# Patient Record
Sex: FEMALE | Race: OTHER | HISPANIC OR LATINO | URBAN - METROPOLITAN AREA
[De-identification: names, ages, dates, MRNs, and addresses within clinical notes are randomized per-mention and may not be internally consistent; named-entity substitution may affect disease eponyms.]

---

## 2020-03-01 ENCOUNTER — OUTPATIENT (OUTPATIENT)
Dept: OUTPATIENT SERVICES | Facility: HOSPITAL | Age: 50
LOS: 1 days | End: 2020-03-01
Payer: MEDICAID

## 2020-03-01 PROCEDURE — G9001: CPT

## 2020-03-10 ENCOUNTER — INPATIENT (INPATIENT)
Facility: HOSPITAL | Age: 50
LOS: 7 days | Discharge: HOME | DRG: 65 | End: 2020-03-18
Attending: INTERNAL MEDICINE | Admitting: INTERNAL MEDICINE
Payer: MEDICAID

## 2020-03-10 VITALS
WEIGHT: 134.92 LBS | RESPIRATION RATE: 20 BRPM | HEART RATE: 107 BPM | DIASTOLIC BLOOD PRESSURE: 135 MMHG | SYSTOLIC BLOOD PRESSURE: 199 MMHG | OXYGEN SATURATION: 100 % | TEMPERATURE: 99 F | HEIGHT: 61 IN

## 2020-03-10 DIAGNOSIS — Z29.9 ENCOUNTER FOR PROPHYLACTIC MEASURES, UNSPECIFIED: ICD-10-CM

## 2020-03-10 DIAGNOSIS — R51 HEADACHE: ICD-10-CM

## 2020-03-10 DIAGNOSIS — I63.9 CEREBRAL INFARCTION, UNSPECIFIED: ICD-10-CM

## 2020-03-10 DIAGNOSIS — Z90.49 ACQUIRED ABSENCE OF OTHER SPECIFIED PARTS OF DIGESTIVE TRACT: Chronic | ICD-10-CM

## 2020-03-10 DIAGNOSIS — I10 ESSENTIAL (PRIMARY) HYPERTENSION: ICD-10-CM

## 2020-03-10 DIAGNOSIS — Z98.890 OTHER SPECIFIED POSTPROCEDURAL STATES: Chronic | ICD-10-CM

## 2020-03-10 LAB
ALBUMIN SERPL ELPH-MCNC: 3.4 G/DL — LOW (ref 3.5–5)
ALP SERPL-CCNC: 87 U/L — SIGNIFICANT CHANGE UP (ref 40–120)
ALT FLD-CCNC: 18 U/L DA — SIGNIFICANT CHANGE UP (ref 10–60)
ANION GAP SERPL CALC-SCNC: 4 MMOL/L — LOW (ref 5–17)
APTT BLD: 26.5 SEC — LOW (ref 27.5–36.3)
AST SERPL-CCNC: 14 U/L — SIGNIFICANT CHANGE UP (ref 10–40)
BASOPHILS # BLD AUTO: 0.02 K/UL — SIGNIFICANT CHANGE UP (ref 0–0.2)
BASOPHILS NFR BLD AUTO: 0.2 % — SIGNIFICANT CHANGE UP (ref 0–2)
BILIRUB SERPL-MCNC: 0.5 MG/DL — SIGNIFICANT CHANGE UP (ref 0.2–1.2)
BUN SERPL-MCNC: 8 MG/DL — SIGNIFICANT CHANGE UP (ref 7–18)
CALCIUM SERPL-MCNC: 8.5 MG/DL — SIGNIFICANT CHANGE UP (ref 8.4–10.5)
CHLORIDE SERPL-SCNC: 108 MMOL/L — SIGNIFICANT CHANGE UP (ref 96–108)
CO2 SERPL-SCNC: 26 MMOL/L — SIGNIFICANT CHANGE UP (ref 22–31)
CREAT SERPL-MCNC: 0.78 MG/DL — SIGNIFICANT CHANGE UP (ref 0.5–1.3)
EOSINOPHIL # BLD AUTO: 0.29 K/UL — SIGNIFICANT CHANGE UP (ref 0–0.5)
EOSINOPHIL NFR BLD AUTO: 3.5 % — SIGNIFICANT CHANGE UP (ref 0–6)
GLUCOSE SERPL-MCNC: 83 MG/DL — SIGNIFICANT CHANGE UP (ref 70–99)
HCG SERPL-ACNC: <1 MIU/ML — SIGNIFICANT CHANGE UP
HCT VFR BLD CALC: 37.4 % — SIGNIFICANT CHANGE UP (ref 34.5–45)
HGB BLD-MCNC: 12 G/DL — SIGNIFICANT CHANGE UP (ref 11.5–15.5)
IMM GRANULOCYTES NFR BLD AUTO: 0.1 % — SIGNIFICANT CHANGE UP (ref 0–1.5)
INR BLD: 1.13 RATIO — SIGNIFICANT CHANGE UP (ref 0.88–1.16)
LYMPHOCYTES # BLD AUTO: 0.8 K/UL — LOW (ref 1–3.3)
LYMPHOCYTES # BLD AUTO: 9.6 % — LOW (ref 13–44)
MCHC RBC-ENTMCNC: 22.2 PG — LOW (ref 27–34)
MCHC RBC-ENTMCNC: 32.1 GM/DL — SIGNIFICANT CHANGE UP (ref 32–36)
MCV RBC AUTO: 69.3 FL — LOW (ref 80–100)
MONOCYTES # BLD AUTO: 0.49 K/UL — SIGNIFICANT CHANGE UP (ref 0–0.9)
MONOCYTES NFR BLD AUTO: 5.9 % — SIGNIFICANT CHANGE UP (ref 2–14)
NEUTROPHILS # BLD AUTO: 6.72 K/UL — SIGNIFICANT CHANGE UP (ref 1.8–7.4)
NEUTROPHILS NFR BLD AUTO: 80.7 % — HIGH (ref 43–77)
NRBC # BLD: 0 /100 WBCS — SIGNIFICANT CHANGE UP (ref 0–0)
PCP SPEC-MCNC: SIGNIFICANT CHANGE UP
PLATELET # BLD AUTO: 321 K/UL — SIGNIFICANT CHANGE UP (ref 150–400)
POTASSIUM SERPL-MCNC: 3.6 MMOL/L — SIGNIFICANT CHANGE UP (ref 3.5–5.3)
POTASSIUM SERPL-SCNC: 3.6 MMOL/L — SIGNIFICANT CHANGE UP (ref 3.5–5.3)
PROT SERPL-MCNC: 7.3 G/DL — SIGNIFICANT CHANGE UP (ref 6–8.3)
PROTHROM AB SERPL-ACNC: 12.6 SEC — SIGNIFICANT CHANGE UP (ref 10–12.9)
RBC # BLD: 5.4 M/UL — HIGH (ref 3.8–5.2)
RBC # FLD: 18.1 % — HIGH (ref 10.3–14.5)
SODIUM SERPL-SCNC: 138 MMOL/L — SIGNIFICANT CHANGE UP (ref 135–145)
WBC # BLD: 8.33 K/UL — SIGNIFICANT CHANGE UP (ref 3.8–10.5)
WBC # FLD AUTO: 8.33 K/UL — SIGNIFICANT CHANGE UP (ref 3.8–10.5)

## 2020-03-10 PROCEDURE — 99223 1ST HOSP IP/OBS HIGH 75: CPT

## 2020-03-10 PROCEDURE — 99285 EMERGENCY DEPT VISIT HI MDM: CPT

## 2020-03-10 PROCEDURE — 70450 CT HEAD/BRAIN W/O DYE: CPT | Mod: 26

## 2020-03-10 RX ORDER — ACETAMINOPHEN 500 MG
650 TABLET ORAL EVERY 6 HOURS
Refills: 0 | Status: DISCONTINUED | OUTPATIENT
Start: 2020-03-10 | End: 2020-03-18

## 2020-03-10 RX ORDER — METOCLOPRAMIDE HCL 10 MG
10 TABLET ORAL ONCE
Refills: 0 | Status: COMPLETED | OUTPATIENT
Start: 2020-03-10 | End: 2020-03-10

## 2020-03-10 RX ORDER — ENOXAPARIN SODIUM 100 MG/ML
40 INJECTION SUBCUTANEOUS DAILY
Refills: 0 | Status: DISCONTINUED | OUTPATIENT
Start: 2020-03-10 | End: 2020-03-18

## 2020-03-10 RX ORDER — AMLODIPINE BESYLATE 2.5 MG/1
5 TABLET ORAL DAILY
Refills: 0 | Status: DISCONTINUED | OUTPATIENT
Start: 2020-03-10 | End: 2020-03-12

## 2020-03-10 RX ORDER — ATORVASTATIN CALCIUM 80 MG/1
80 TABLET, FILM COATED ORAL AT BEDTIME
Refills: 0 | Status: DISCONTINUED | OUTPATIENT
Start: 2020-03-10 | End: 2020-03-18

## 2020-03-10 RX ORDER — SODIUM CHLORIDE 9 MG/ML
1000 INJECTION INTRAMUSCULAR; INTRAVENOUS; SUBCUTANEOUS ONCE
Refills: 0 | Status: COMPLETED | OUTPATIENT
Start: 2020-03-10 | End: 2020-03-10

## 2020-03-10 RX ORDER — ASPIRIN/CALCIUM CARB/MAGNESIUM 324 MG
81 TABLET ORAL DAILY
Refills: 0 | Status: DISCONTINUED | OUTPATIENT
Start: 2020-03-10 | End: 2020-03-18

## 2020-03-10 RX ORDER — MAGNESIUM SULFATE 500 MG/ML
2 VIAL (ML) INJECTION ONCE
Refills: 0 | Status: COMPLETED | OUTPATIENT
Start: 2020-03-10 | End: 2020-03-10

## 2020-03-10 RX ORDER — HYDRALAZINE HCL 50 MG
10 TABLET ORAL ONCE
Refills: 0 | Status: COMPLETED | OUTPATIENT
Start: 2020-03-10 | End: 2020-03-10

## 2020-03-10 RX ORDER — KETOROLAC TROMETHAMINE 30 MG/ML
30 SYRINGE (ML) INJECTION EVERY 6 HOURS
Refills: 0 | Status: DISCONTINUED | OUTPATIENT
Start: 2020-03-10 | End: 2020-03-10

## 2020-03-10 RX ORDER — TRAMADOL HYDROCHLORIDE 50 MG/1
50 TABLET ORAL EVERY 8 HOURS
Refills: 0 | Status: DISCONTINUED | OUTPATIENT
Start: 2020-03-10 | End: 2020-03-11

## 2020-03-10 RX ADMIN — Medication 2 GRAM(S): at 19:54

## 2020-03-10 RX ADMIN — SODIUM CHLORIDE 1000 MILLILITER(S): 9 INJECTION INTRAMUSCULAR; INTRAVENOUS; SUBCUTANEOUS at 19:55

## 2020-03-10 RX ADMIN — SODIUM CHLORIDE 1000 MILLILITER(S): 9 INJECTION INTRAMUSCULAR; INTRAVENOUS; SUBCUTANEOUS at 18:24

## 2020-03-10 RX ADMIN — Medication 50 GRAM(S): at 18:23

## 2020-03-10 RX ADMIN — Medication 104 MILLIGRAM(S): at 18:23

## 2020-03-10 RX ADMIN — ATORVASTATIN CALCIUM 80 MILLIGRAM(S): 80 TABLET, FILM COATED ORAL at 21:17

## 2020-03-10 RX ADMIN — Medication 25 MILLIGRAM(S): at 19:53

## 2020-03-10 RX ADMIN — Medication 81 MILLIGRAM(S): at 19:54

## 2020-03-10 RX ADMIN — Medication 10 MILLIGRAM(S): at 18:23

## 2020-03-10 RX ADMIN — Medication 10 MILLIGRAM(S): at 19:55

## 2020-03-10 RX ADMIN — AMLODIPINE BESYLATE 5 MILLIGRAM(S): 2.5 TABLET ORAL at 21:17

## 2020-03-10 NOTE — H&P ADULT - NSICDXPASTMEDICALHX_GEN_ALL_CORE_FT
PAST MEDICAL HISTORY:  HTN (hypertension) PAST MEDICAL HISTORY:  AS (sickle cell trait)     HTN (hypertension)

## 2020-03-10 NOTE — ED PROVIDER NOTE - OBJECTIVE STATEMENT
49 y.o w/ pmh of htn, presenting with intermittent headache x 1 month. on chloridiazide for htn, endorses noncompliance. endorses noting right sided numbness today but noted left sided numbness yesterday. denies n, v, fever, cough, cp, sob, recent travel, sick contact. endorses episode today started at 10 am.

## 2020-03-10 NOTE — H&P ADULT - PROBLEM SELECTOR PLAN 4
IMPROVE VTE Individual Risk Assessment  RISK                                                                Points  [  ] Previous VTE                                                  3  [  ] Thrombophilia                                               2  [  ] Lower limb paralysis                                      2  [  ] Current Cancer                                              2         (within 6 months)  [  ] Immobilization > 24 hrs                                1  [  ] ICU/CCU stay > 24 hours                              1  [  ] Age > 60                                                      1  IMPROVE VTE Score ___1_____  DVT ppx :lovenox  GI ppx : no need

## 2020-03-10 NOTE — CONSULT NOTE ADULT - ATTENDING COMMENTS
I counseled the patient about his CT Head findings and the further testing indicated to determine the etiology of his chronic stroke.

## 2020-03-10 NOTE — PROGRESS NOTE ADULT - SUBJECTIVE AND OBJECTIVE BOX
HPI:49 year old female known hypertensive  not taking meds from a while she doesnt take antihypertensive as it gives her headaches ( afew meds changed but still was giving her headche)  have headche from one year but from getting worse  no neck pain  but rom one day rt sided numbness   no nausea or vomiting  bcs of headache , she decided to come  in er sbp was over 200  hydralazine  CT had showed L pca stroke  no tpa as out of window , neuro was called .  still has headche  alert awake non focal        Patient is a 49y old  Female who presents with a chief complaint of     INTERVAL HPI/OVERNIGHT EVENTS:  T(C): 37 (03-10-20 @ 19:10), Max: 37.1 (03-10-20 @ 17:04)  HR: 115 (03-10-20 @ 19:10) (107 - 115)  BP: 183/93 (03-10-20 @ 19:10) (183/93 - 199/135)  RR: 19 (03-10-20 @ 19:10) (19 - 20)  SpO2: 100% (03-10-20 @ 19:10) (100% - 100%)  Wt(kg): --  I&O's Summary      REVIEW OF SYSTEMS: denies fever, chills, SOB, palpitations, chest pain, abdominal pain, nausea, vomitting, diarrhea, constipation, dizziness    MEDICATIONS  (STANDING):  amLODIPine   Tablet 5 milliGRAM(s) Oral daily  aspirin  chewable 81 milliGRAM(s) Oral daily  atorvastatin 80 milliGRAM(s) Oral at bedtime    MEDICATIONS  (PRN):  acetaminophen   Tablet .. 650 milliGRAM(s) Oral every 6 hours PRN Temp greater or equal to 38C (100.4F), Mild Pain (1 - 3)  ketorolac   Injectable 30 milliGRAM(s) IV Push every 6 hours PRN Severe Pain (7 - 10)  traMADol 50 milliGRAM(s) Oral every 8 hours PRN Severe Pain (7 - 10)      PHYSICAL EXAM:  GENERAL: NAD, well-groomed, well-developed  HEAD:  Atraumatic, Normocephalic  EYES: EOMI, PERRLA, conjunctiva and sclera clear  ENMT: No tonsillar erythema, exudates, or enlargement; Moist mucous membranes, Good dentition, No lesions  NECK: Supple, No JVD, Normal thyroid  NERVOUS SYSTEM:  Alert & Oriented X3, Good concentration; Motor Strength 5/5 B/L upper and lower extremities; DTRs 2+ intact and symmetric  CHEST/LUNG: Clear to percussion bilaterally; No rales, rhonchi, wheezing, or rubs  HEART: Regular rate and rhythm; No murmurs, rubs, or gallops  ABDOMEN: Soft, Nontender, Nondistended; Bowel sounds present  EXTREMITIES:  2+ Peripheral Pulses, No clubbing, cyanosis, or edema  LYMPH: No lymphadenopathy noted  SKIN: No rashes or lesions  LABS:                        12.0   8.33  )-----------( 321      ( 10 Mar 2020 18:19 )             37.4     03-10    138  |  108  |  8   ----------------------------<  83  3.6   |  26  |  0.78    Ca    8.5      10 Mar 2020 18:19    TPro  7.3  /  Alb  3.4<L>  /  TBili  0.5  /  DBili  x   /  AST  14  /  ALT  18  /  AlkPhos  87  03-10    PT/INR - ( 10 Mar 2020 18:19 )   PT: 12.6 sec;   INR: 1.13 ratio         PTT - ( 10 Mar 2020 18:19 )  PTT:26.5 sec    CAPILLARY BLOOD GLUCOSE      POCT Blood Glucose.: 100 mg/dL (10 Mar 2020 17:12)

## 2020-03-10 NOTE — ED ADULT NURSE NOTE - NSIMPLEMENTINTERV_GEN_ALL_ED
Implemented All Universal Safety Interventions:  [place] to call system. Call bell, personal items and telephone within reach. Instruct patient to call for assistance. Room bathroom lighting operational. Non-slip footwear when patient is off stretcher. Physically safe environment: no spills, clutter or unnecessary equipment. Stretcher in lowest position, wheels locked, appropriate side rails in place.

## 2020-03-10 NOTE — CONSULT NOTE ADULT - SUBJECTIVE AND OBJECTIVE BOX
49 F presented with recurrent left-sided headache and right upper and lower extremity weakness and numbness, NIHSS 3, MRS 2. LSN yesterday before sleep.    CT Head shows left PCA territory infarct.    Assessment:  Hypertensive headache and left PCA infarct 2/2 hypertension vs. cardioembolic source    Recs:    1. Stroke workup  - CT Angiogram Head & Neck  - Transthoracic Echocardiogram with bubble study   - Telemetry  - Hemoglobin A1c  - Fasting lipid panel    2. Secondary stroke prevention  - Q4H Neurochecks & Vital signs  - Aspirin 81mg daily  - Clopidogrel 75mg daily x 21 days  - Atorvastatin 40mg QHS  - Treat BP if over 180/110 within first 24 hours of last seen normal; thereafter treat if over 120/80  - For BP management, consider starting verapamil instead of home medication, chlorthalidone, with which patient was not compliant. Verapamil can help treat both hypertension and chronic daily headache.  - PT/OT  - DVT ppx        NOTE TO BE COMPLETED - PLEASE REFER TO ABOVE ONLY AND IGNORE INFORMATION BELOW        NEUROLOGY CONSULT NOTE    NAME:  GEORGIANA RAMIREZ    CHIEF COMPLAINT:  Patient is a 49y old  Female who presents with a chief complaint of R sided Numbness and weakness (10 Mar 2020 19:19)      HPI:  49 y F from home walks independently with Mhx of HTN ( non compliant with meds), Sickle cell trait( never received any transfusion) and PSHx of heart surgery?( for some clot) presented to ED with Numbness, tingling and weakness of the Right side since yesterday. As per pt she was last seen normal 7am yesterday. Pt started having Numbness of the Right leg yesterday  which resolved after some time. Today pt woke up around 7 am with dizziness so went back to sleep. Pt then woke up at 10:30 am and noticed Numbness and tingling to the right side of face, arm and leg associated with weakness . Symptoms improve after 1 hr. Pt is having severe one sided headache every day for last 1 month. Pt is non compliant with her BP medication and donot check her BP at home   Pt denies any prior episode of numbness , h/o migraine, blurry vision, urinary or bladder incontinence ,seizures, CP, SOB, cough , Abd pain , nausea, vomiting or any urinary symptoms     ED course:   Vitals : Afebrile . 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   Utox -ve     SH : Denies Smoking, alcohol or drug use (10 Mar 2020 19:19)      NEURO HPI:      PAST MEDICAL & SURGICAL HISTORY:  AS (sickle cell trait)  HTN (hypertension)  History of cholecystectomy  H/O heart surgery: 2015      MEDICATIONS:  acetaminophen   Tablet .. 650 milliGRAM(s) Oral every 6 hours PRN  amLODIPine   Tablet 5 milliGRAM(s) Oral daily  aspirin  chewable 81 milliGRAM(s) Oral daily  atorvastatin 80 milliGRAM(s) Oral at bedtime  enoxaparin Injectable 40 milliGRAM(s) SubCutaneous daily  influenza   Vaccine 0.5 milliLiter(s) IntraMuscular once  ketorolac   Injectable 30 milliGRAM(s) IV Push every 6 hours PRN  traMADol 50 milliGRAM(s) Oral every 8 hours PRN      ALLERGIES:  No Known Allergies      FAMILY HISTORY:  FH: HTN (hypertension)      SOCIAL HISTORY:  Denies alcohol, tobacco, and illicit drug use    REVIEW OF SYSTEMS:  GENERAL: No fever, weight changes, fatigue  EYES: No eye pain or discharge  EAR/NOSE/MOUTH/THROAT: No sinus or throat pain; No difficulty hearing  NECK: No pain or stiffness  RESPIRATORY: No cough, wheezing, chills, or hemoptysis  CARDIOVASCULAR: No chest pain, palpitations, shortness of breath, or dyspnea on exertion  GASTROINTESTINAL: No abdominal pain, nausea, vomiting, hematemesis, diarrhea, or constipation  GENITOURINARY: No dysuria, frequency, hematuria, or incontinence  SKIN: No rashes or lesions  ENDOCRINE: No heat or cold intolerance  HEMATOLOGIC: No easy bruising or bleeding  PSYCHIATRIC: No depression, anxiety, or mood swings  MUSCULOSKELETAL: No joint pain or swelling  NEUROLOGICAL: As per HPI      OBJECTIVE:    Vital Signs Last 24 Hrs  T(C): 37.7 (11 Mar 2020 02:13), Max: 37.7 (11 Mar 2020 02:13)  T(F): 99.9 (11 Mar 2020 02:13), Max: 99.9 (11 Mar 2020 02:13)  HR: 99 (11 Mar 2020 02:13) (99 - 115)  BP: 125/83 (11 Mar 2020 02:13) (125/83 - 199/135)  BP(mean): --  RR: 18 (11 Mar 2020 02:13) (18 - 20)  SpO2: 99% (11 Mar 2020 02:13) (99% - 100%)    General Examination:  General: No acute distress  HEENT: Atraumatic, Normocephalic  Respiratory: CTA B/l.  No crackles, rhonchi, or wheezes.  Cardiovascular: RRR.  Normal S1 & S2.  Normal b/l radial and pedal pulses.    Neurological Examination:  General / Mental Status: AAO x 3.  No aphasia or dysarthria.  Naming and repetition intact.  Cranial Nerves: VFF x 4.  PERRL.  EOMI x 2, No nystagmus or diplopia.  B/l V1-V3 equal and intact to light touch and pinprick.  Symmetric facial movement and palate elevation.  B/l hearing equal to finger rub.  5/5 strength with b/l sternocleidomastoid & trapezius.  Midline tongue protrusion, with no atrophy or fasciculations.  Motor: Normal bulk & tone in all four extremities.  5/5 strength throughout all four extremities.  No downward drift, rigidity, spasticity, or tremors in any of the four extremities.  Sensory: Intact to light touch and pinprick in all four extremities.  Negative Romberg.  Reflex: 2+ and symmetric at b/l biceps, triceps, brachioradialis, patellae, and ankles.  Downgoing toes b/l.  Coordination: No dysmetria with b/l finger-to-nose and heel raise tests.  Symmetric rapid alternating movements b/l.  Gait: Normal, narrow-based gait.  No difficulty with tiptoe, heel, and tandem gaits.      Laboratory Values:    CBC Full  -  ( 10 Mar 2020 18:19 )  WBC Count : 8.33 K/uL  RBC Count : 5.40 M/uL  Hemoglobin : 12.0 g/dL  Hematocrit : 37.4 %  Platelet Count - Automated : 321 K/uL  Mean Cell Volume : 69.3 fl  Mean Cell Hemoglobin : 22.2 pg  Mean Cell Hemoglobin Concentration : 32.1 gm/dL  Auto Neutrophil # : 6.72 K/uL  Auto Lymphocyte # : 0.80 K/uL  Auto Monocyte # : 0.49 K/uL  Auto Eosinophil # : 0.29 K/uL  Auto Basophil # : 0.02 K/uL  Auto Neutrophil % : 80.7 %  Auto Lymphocyte % : 9.6 %  Auto Monocyte % : 5.9 %  Auto Eosinophil % : 3.5 %  Auto Basophil % : 0.2 %      03-10    138  |  108  |  8   ----------------------------<  83  3.6   |  26  |  0.78    Ca    8.5      10 Mar 2020 18:19    TPro  7.3  /  Alb  3.4<L>  /  TBili  0.5  /  DBili  x   /  AST  14  /  ALT  18  /  AlkPhos  87  03-10                           Neuroimaging:      Please contact the Neurology consult service with any questions.    Yg Cyr MD   of Neurology  [facility] at [facility] NEUROLOGY CONSULT NOTE    NAME:  GEORGIANA RAMIREZ    CHIEF COMPLAINT:  Patient is a 49y old  Female who presents with a chief complaint of R sided Numbness and weakness (10 Mar 2020 19:19)    HPI:  49 y F from home walks independently with Mhx of HTN (noncompliant with meds), Sickle cell trait( never received any transfusion) and PSHx of heart surgery?( for some clot) presented to ED with numbness, tingling and weakness of the Right side since yesterday. As per pt she was last seen normal 7am yesterday. Pt started having numbness of the Right leg yesterday  which resolved after some time. Today pt woke up around 7 am with dizziness so went back to sleep. Pt then woke up at 10:30 am and noticed Numbness and tingling to the right side of face, arm and leg associated with weakness. Symptoms improve after 1 hr. Pt is having severe one sided headache every day for last 1 month. Pt is noncompliant with her BP medication and did not check her BP at home   Pt denies any prior episode of numbness, h/o migraine, blurry vision, urinary or bladder incontinence, seizures, CP, SOB, cough, Abd pain, nausea, vomiting or any urinary symptoms.  ED course:   Vitals : Afebrile . 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   Utox -ve     NEURO HPI:  49 LHF presented with recurrent left-sided headache and right upper and lower extremity weakness and numbness, last seen normal yesterday before sleep (approximately 22:00 on 3/9/20).    PAST MEDICAL & SURGICAL HISTORY:  AS (sickle cell trait)  HTN (hypertension)  History of cholecystectomy  H/O heart surgery: 2015    MEDICATIONS:  acetaminophen   Tablet .. 650 milliGRAM(s) Oral every 6 hours PRN  amLODIPine   Tablet 5 milliGRAM(s) Oral daily  aspirin  chewable 81 milliGRAM(s) Oral daily  atorvastatin 80 milliGRAM(s) Oral at bedtime  enoxaparin Injectable 40 milliGRAM(s) SubCutaneous daily  influenza   Vaccine 0.5 milliLiter(s) IntraMuscular once  ketorolac   Injectable 30 milliGRAM(s) IV Push every 6 hours PRN  traMADol 50 milliGRAM(s) Oral every 8 hours PRN    ALLERGIES:  No Known Allergies    FAMILY HISTORY:  FH: HTN (hypertension)    SOCIAL HISTORY:  Denies Smoking, alcohol or drug use (10 Mar 2020 19:19)    REVIEW OF SYSTEMS:  GENERAL: No fever, weight changes, fatigue  EYES: No eye pain or discharge  EAR/NOSE/MOUTH/THROAT: No sinus or throat pain  NECK: No pain or stiffness  RESPIRATORY: No cough, wheezing, chills, or hemoptysis  CARDIOVASCULAR: No chest pain, palpitations, shortness of breath, or dyspnea on exertion  GASTROINTESTINAL: No abdominal pain, nausea, vomiting, hematemesis, diarrhea, or constipation  GENITOURINARY: No dysuria, frequency, hematuria, or incontinence  SKIN: No rashes or lesions  ENDOCRINE: No heat or cold intolerance  HEMATOLOGIC: No easy bruising or bleeding  PSYCHIATRIC: No depression, anxiety, or mood swings  MUSCULOSKELETAL: No joint pain or swelling  NEUROLOGICAL: As per HPI      OBJECTIVE:    Vital Signs Last 24 Hrs  T(C): 37.7 (11 Mar 2020 02:13), Max: 37.7 (11 Mar 2020 02:13)  T(F): 99.9 (11 Mar 2020 02:13), Max: 99.9 (11 Mar 2020 02:13)  HR: 99 (11 Mar 2020 02:13) (99 - 115)  BP: 125/83 (11 Mar 2020 02:13) (125/83 - 199/135)  RR: 18 (11 Mar 2020 02:13) (18 - 20)  SpO2: 99% (11 Mar 2020 02:13) (99% - 100%)    General Examination:  General: No acute distress  HEENT: Atraumatic, Normocephalic  Respiratory: CTA B/l.  No crackles, rhonchi, or wheezes.  Cardiovascular: RRR.  Normal S1 & S2.  Normal b/l radial and pedal pulses.    Neurological Examination:  General / Mental Status: AAO x 3.  No aphasia or dysarthria.  Naming and repetition intact.  Cranial Nerves: VFF x 4.  PERRL.  EOMI x 2, No nystagmus or diplopia.  B/l V1-V3 equal and intact to light touch and pinprick.  Symmetric facial movement and palate elevation.  B/l hearing equal to finger rub.  5/5 strength with b/l sternocleidomastoid & trapezius.  Midline tongue protrusion, with no atrophy or fasciculations.  Motor: Normal bulk & tone in all four extremities.  At least 3/5 strength throughout right upper and right lower extremities, both of which show downward drift.  5/5 strength throughout left upper and left lower extremities, without downward dirft.  No rigidity, spasticity, or tremors in any of the four extremities.  Sensory: Diminished to light touch and pinprick in right upper and right lower extremities.  Intact to light touch and pinprick in left upper and left lower extremities.  Negative Romberg.  Reflex: 2+ and symmetric at b/l biceps, triceps, brachioradialis, patellae, and ankles.  Downgoing toes b/l.  Coordination: No dysmetria with b/l finger-to-nose and heel raise tests.  Symmetric rapid alternating movements b/l.  Gait: Normal, narrow-based gait.  No difficulty with tiptoe, heel, and tandem gaits.      NIHSS Score:    LOC - 0  LOC Questions - 0  LOC Commands - 0  Gaze Preference - 0  Visual Fields - 0  Facial Palsy - 0  Motor Arm Left - 0  Motor Arm Right - 1  Motor Leg Left - 0  Motor Leg Right - 1  Limb Ataxia - 0  Sensory - 1  Language - 0  Speech - 0  Extinction - 0    NIHSS Score Total: 3    Modified Gilford Scale: 2      Laboratory Values:    CBC Full  -  ( 10 Mar 2020 18:19 )  WBC Count : 8.33 K/uL  RBC Count : 5.40 M/uL  Hemoglobin : 12.0 g/dL  Hematocrit : 37.4 %  Platelet Count - Automated : 321 K/uL  Mean Cell Volume : 69.3 fl  Mean Cell Hemoglobin : 22.2 pg  Mean Cell Hemoglobin Concentration : 32.1 gm/dL  Auto Neutrophil # : 6.72 K/uL  Auto Lymphocyte # : 0.80 K/uL  Auto Monocyte # : 0.49 K/uL  Auto Eosinophil # : 0.29 K/uL  Auto Basophil # : 0.02 K/uL  Auto Neutrophil % : 80.7 %  Auto Lymphocyte % : 9.6 %  Auto Monocyte % : 5.9 %  Auto Eosinophil % : 3.5 %  Auto Basophil % : 0.2 %    03-10    138  |  108  |  8   ----------------------------<  83  3.6   |  26  |  0.78    Ca    8.5      10 Mar 2020 18:19    TPro  7.3  /  Alb  3.4<L>  /  TBili  0.5  /  DBili  x   /  AST  14  /  ALT  18  /  AlkPhos  87  03-10           Neuroimaging:    CT Head (3/10/20): Chronic left PCA territory infarct      Assessment:  Hypertensive headache and chronic left PCA territory infarct secondary to hypertension vs. cardioembolic source      Recommendations:    1. Stroke workup  - CT Angiogram Head & Neck  - Transthoracic Echocardiogram with bubble study   - Telemetry  - Hemoglobin A1c  - Fasting lipid panel    2. Secondary stroke prevention  - Q4H Neurochecks & Vital signs  - Aspirin 81mg daily  - Clopidogrel 75mg daily x 21 days  - Atorvastatin 40mg QHS  - Treat BP if over 180/110 within first 24 hours of last seen normal; thereafter treat if over 120/80  - For BP management, consider starting verapamil instead of home medication, chlorthalidone, with which patient was not compliant. Verapamil can help treat both hypertension and chronic daily headache.  - PT/OT  - DVT ppx      Please contact the Neurology consult service with any questions.    Yg Cyr MD   of Neurology  [facility] at [facility] NEUROLOGY CONSULT NOTE    NAME:  GEORGIANA RAMIREZ    CHIEF COMPLAINT:  Patient is a 49y old  Female who presents with a chief complaint of R sided Numbness and weakness (10 Mar 2020 19:19)    HPI:  49 y F from home walks independently with Mhx of HTN (noncompliant with meds), Sickle cell trait( never received any transfusion) and PSHx of heart surgery?( for some clot) presented to ED with numbness, tingling and weakness of the Right side since yesterday. As per pt she was last seen normal 7am yesterday. Pt started having numbness of the Right leg yesterday  which resolved after some time. Today pt woke up around 7 am with dizziness so went back to sleep. Pt then woke up at 10:30 am and noticed Numbness and tingling to the right side of face, arm and leg associated with weakness. Symptoms improve after 1 hr. Pt is having severe one sided headache every day for last 1 month. Pt is noncompliant with her BP medication and did not check her BP at home   Pt denies any prior episode of numbness, h/o migraine, blurry vision, urinary or bladder incontinence, seizures, CP, SOB, cough, Abd pain, nausea, vomiting or any urinary symptoms.  ED course:   Vitals : Afebrile . 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   Utox -ve     NEURO HPI:  49 LHF presented with recurrent left-sided headache and right upper and lower extremity weakness and numbness, last seen normal yesterday before sleep (approximately 22:00 on 3/9/20).    PAST MEDICAL & SURGICAL HISTORY:  AS (sickle cell trait)  HTN (hypertension)  History of cholecystectomy  H/O heart surgery: 2015    MEDICATIONS:  acetaminophen   Tablet .. 650 milliGRAM(s) Oral every 6 hours PRN  amLODIPine   Tablet 5 milliGRAM(s) Oral daily  aspirin  chewable 81 milliGRAM(s) Oral daily  atorvastatin 80 milliGRAM(s) Oral at bedtime  enoxaparin Injectable 40 milliGRAM(s) SubCutaneous daily  influenza   Vaccine 0.5 milliLiter(s) IntraMuscular once  ketorolac   Injectable 30 milliGRAM(s) IV Push every 6 hours PRN  traMADol 50 milliGRAM(s) Oral every 8 hours PRN    ALLERGIES:  No Known Allergies    FAMILY HISTORY:  FH: HTN (hypertension)    SOCIAL HISTORY:  Denies Smoking, alcohol or drug use (10 Mar 2020 19:19)    REVIEW OF SYSTEMS:  GENERAL: No fever, weight changes, fatigue  EYES: No eye pain or discharge  EAR/NOSE/MOUTH/THROAT: No sinus or throat pain  NECK: No pain or stiffness  RESPIRATORY: No cough, wheezing, chills, or hemoptysis  CARDIOVASCULAR: No chest pain, palpitations, shortness of breath, or dyspnea on exertion  GASTROINTESTINAL: No abdominal pain, nausea, vomiting, hematemesis, diarrhea, or constipation  GENITOURINARY: No dysuria, frequency, hematuria, or incontinence  SKIN: No rashes or lesions  ENDOCRINE: No heat or cold intolerance  HEMATOLOGIC: No easy bruising or bleeding  PSYCHIATRIC: No depression, anxiety, or mood swings  MUSCULOSKELETAL: No joint pain or swelling  NEUROLOGICAL: As per HPI      OBJECTIVE:    Vital Signs Last 24 Hrs  T(C): 37.7 (11 Mar 2020 02:13), Max: 37.7 (11 Mar 2020 02:13)  T(F): 99.9 (11 Mar 2020 02:13), Max: 99.9 (11 Mar 2020 02:13)  HR: 99 (11 Mar 2020 02:13) (99 - 115)  BP: 125/83 (11 Mar 2020 02:13) (125/83 - 199/135)  RR: 18 (11 Mar 2020 02:13) (18 - 20)  SpO2: 99% (11 Mar 2020 02:13) (99% - 100%)    General Examination:  General: No acute distress  HEENT: Atraumatic, Normocephalic  Respiratory: CTA B/l.  No crackles, rhonchi, or wheezes.  Cardiovascular: RRR.  Normal S1 & S2.  Normal b/l radial and pedal pulses.    Neurological Examination:  General / Mental Status: AAO x 3.  No aphasia or dysarthria.  Naming and repetition intact.  Cranial Nerves: VFF x 4.  PERRL.  EOMI x 2, No nystagmus or diplopia.  B/l V1-V3 equal and intact to light touch and pinprick.  Symmetric facial movement and palate elevation.  B/l hearing equal to finger rub.  5/5 strength with b/l sternocleidomastoid & trapezius.  Midline tongue protrusion, with no atrophy or fasciculations.  Motor: Normal bulk & tone in all four extremities.  At least 3/5 strength throughout right upper and right lower extremities, both of which show downward drift and spasticity.  5/5 strength throughout left upper and left lower extremities, without downward drift and spasticity.  Sensory: Diminished to light touch and pinprick in right upper and right lower extremities.  Intact to light touch and pinprick in left upper and left lower extremities.  Negative Romberg.  Reflex: 2+ and symmetric at b/l biceps, triceps, brachioradialis, patellae, and ankles.  Downgoing toes b/l.  Coordination: No dysmetria with b/l finger-to-nose and heel raise tests.  Symmetric rapid alternating movements b/l.  Gait: Normal, narrow-based gait.  No difficulty with tiptoe, heel, and tandem gaits.      NIHSS Score:    LOC - 0  LOC Questions - 0  LOC Commands - 0  Gaze Preference - 0  Visual Fields - 0  Facial Palsy - 0  Motor Arm Left - 0  Motor Arm Right - 1  Motor Leg Left - 0  Motor Leg Right - 1  Limb Ataxia - 0  Sensory - 1  Language - 0  Speech - 0  Extinction - 0    NIHSS Score Total: 3    Modified Rj Scale: 2      Laboratory Values:    CBC Full  -  ( 10 Mar 2020 18:19 )  WBC Count : 8.33 K/uL  RBC Count : 5.40 M/uL  Hemoglobin : 12.0 g/dL  Hematocrit : 37.4 %  Platelet Count - Automated : 321 K/uL  Mean Cell Volume : 69.3 fl  Mean Cell Hemoglobin : 22.2 pg  Mean Cell Hemoglobin Concentration : 32.1 gm/dL  Auto Neutrophil # : 6.72 K/uL  Auto Lymphocyte # : 0.80 K/uL  Auto Monocyte # : 0.49 K/uL  Auto Eosinophil # : 0.29 K/uL  Auto Basophil # : 0.02 K/uL  Auto Neutrophil % : 80.7 %  Auto Lymphocyte % : 9.6 %  Auto Monocyte % : 5.9 %  Auto Eosinophil % : 3.5 %  Auto Basophil % : 0.2 %    03-10    138  |  108  |  8   ----------------------------<  83  3.6   |  26  |  0.78    Ca    8.5      10 Mar 2020 18:19    TPro  7.3  /  Alb  3.4<L>  /  TBili  0.5  /  DBili  x   /  AST  14  /  ALT  18  /  AlkPhos  87  03-10           Neuroimaging:    CT Head (3/10/20): Chronic left PCA territory infarct      Assessment:  Hypertensive headache and chronic left PCA territory infarct secondary to hypertension vs. cardioembolic source      Recommendations:    1. Stroke workup  - CT Angiogram Head & Neck  - Transthoracic Echocardiogram with bubble study   - Telemetry  - Hemoglobin A1c  - Fasting lipid panel    2. Secondary stroke prevention  - Q4H Neurochecks & Vital signs  - Aspirin 81mg daily  - Clopidogrel 75mg daily x 21 days  - Atorvastatin 40mg QHS  - Treat BP if over 180/110 within first 24 hours of last seen normal; thereafter treat if over 120/80  - For BP management, consider starting verapamil instead of home medication, chlorthalidone, with which patient was not compliant. Verapamil can help treat both hypertension and chronic daily headache.  - PT/OT  - DVT ppx      Please contact the Neurology consult service with any questions.    Yg Cyr MD   of Neurology  [facility] at [facility] NEUROLOGY CONSULT NOTE    NAME:  GEORGIANA RAMIREZ    CHIEF COMPLAINT:  Patient is a 49y old  Female who presents with a chief complaint of R sided Numbness and weakness (10 Mar 2020 19:19)    HPI:  49 y F from home walks independently with Mhx of HTN (noncompliant with meds), Sickle cell trait( never received any transfusion) and PSHx of heart surgery?( for some clot) presented to ED with numbness, tingling and weakness of the Right side since yesterday. As per pt she was last seen normal 7am yesterday. Pt started having numbness of the Right leg yesterday  which resolved after some time. Today pt woke up around 7 am with dizziness so went back to sleep. Pt then woke up at 10:30 am and noticed Numbness and tingling to the right side of face, arm and leg associated with weakness. Symptoms improve after 1 hr. Pt is having severe one sided headache every day for last 1 month. Pt is noncompliant with her BP medication and did not check her BP at home   Pt denies any prior episode of numbness, h/o migraine, blurry vision, urinary or bladder incontinence, seizures, CP, SOB, cough, Abd pain, nausea, vomiting or any urinary symptoms.  ED course:   Vitals : Afebrile . 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   Utox -ve     NEURO HPI:  49 LHF presented with recurrent left-sided headache and right upper and lower extremity weakness and numbness, last seen normal yesterday before sleep (approximately 22:00 on 3/9/20).    PAST MEDICAL & SURGICAL HISTORY:  AS (sickle cell trait)  HTN (hypertension)  History of cholecystectomy  H/O heart surgery: 2015    MEDICATIONS:  acetaminophen   Tablet .. 650 milliGRAM(s) Oral every 6 hours PRN  amLODIPine   Tablet 5 milliGRAM(s) Oral daily  aspirin  chewable 81 milliGRAM(s) Oral daily  atorvastatin 80 milliGRAM(s) Oral at bedtime  enoxaparin Injectable 40 milliGRAM(s) SubCutaneous daily  influenza   Vaccine 0.5 milliLiter(s) IntraMuscular once  ketorolac   Injectable 30 milliGRAM(s) IV Push every 6 hours PRN  traMADol 50 milliGRAM(s) Oral every 8 hours PRN    ALLERGIES:  No Known Allergies    FAMILY HISTORY:  FH: HTN (hypertension)    SOCIAL HISTORY:  Denies Smoking, alcohol or drug use (10 Mar 2020 19:19)    REVIEW OF SYSTEMS:  GENERAL: No fever, weight changes, fatigue  EYES: No eye pain or discharge  EAR/NOSE/MOUTH/THROAT: No sinus or throat pain  NECK: No pain or stiffness  RESPIRATORY: No cough, wheezing, chills, or hemoptysis  CARDIOVASCULAR: No chest pain, palpitations, shortness of breath, or dyspnea on exertion  GASTROINTESTINAL: No abdominal pain, nausea, vomiting, hematemesis, diarrhea, or constipation  GENITOURINARY: No dysuria, frequency, hematuria, or incontinence  SKIN: No rashes or lesions  ENDOCRINE: No heat or cold intolerance  HEMATOLOGIC: No easy bruising or bleeding  PSYCHIATRIC: No depression, anxiety, or mood swings  MUSCULOSKELETAL: No joint pain or swelling  NEUROLOGICAL: As per HPI      OBJECTIVE:    Vital Signs Last 24 Hrs  T(C): 37.7 (11 Mar 2020 02:13), Max: 37.7 (11 Mar 2020 02:13)  T(F): 99.9 (11 Mar 2020 02:13), Max: 99.9 (11 Mar 2020 02:13)  HR: 99 (11 Mar 2020 02:13) (99 - 115)  BP: 125/83 (11 Mar 2020 02:13) (125/83 - 199/135)  RR: 18 (11 Mar 2020 02:13) (18 - 20)  SpO2: 99% (11 Mar 2020 02:13) (99% - 100%)    General Examination:  General: No acute distress  HEENT: Atraumatic, Normocephalic  Respiratory: CTA B/l.  No crackles, rhonchi, or wheezes.  Cardiovascular: RRR.  Normal S1 & S2.  Normal b/l radial and pedal pulses.    Neurological Examination:  General / Mental Status: AAO x 3.  No aphasia or dysarthria.  Naming and repetition intact.  Cranial Nerves: VFF x 4.  PERRL.  EOMI x 2, No nystagmus or diplopia.  B/l V1-V3 equal and intact to light touch and pinprick.  Symmetric facial movement and palate elevation.  B/l hearing equal to finger rub.  5/5 strength with b/l sternocleidomastoid & trapezius.  Midline tongue protrusion, with no atrophy or fasciculations.  Motor: Normal bulk & tone in all four extremities.  At least 3/5 strength throughout right upper and right lower extremities, both of which show downward drift and spasticity.  5/5 strength throughout left upper and left lower extremities, without downward drift and spasticity.  Sensory: Diminished to light touch and pinprick in right upper and right lower extremities.  Intact to light touch and pinprick in left upper and left lower extremities.  Reflex: 2+ and symmetric at b/l biceps, triceps, brachioradialis, patellae, and ankles.  Downgoing toes b/l.  Coordination: No dysmetria with b/l finger-to-nose and heel raise tests.  Symmetric rapid alternating movements b/l.  Gait and Romberg testing deferred due to right-sided weakness.    NIHSS Score:    LOC - 0  LOC Questions - 0  LOC Commands - 0  Gaze Preference - 0  Visual Fields - 0  Facial Palsy - 0  Motor Arm Left - 0  Motor Arm Right - 1  Motor Leg Left - 0  Motor Leg Right - 1  Limb Ataxia - 0  Sensory - 1  Language - 0  Speech - 0  Extinction - 0    NIHSS Score Total: 3    Modified [place] Scale: 2      Laboratory Values:    CBC Full  -  ( 10 Mar 2020 18:19 )  WBC Count : 8.33 K/uL  RBC Count : 5.40 M/uL  Hemoglobin : 12.0 g/dL  Hematocrit : 37.4 %  Platelet Count - Automated : 321 K/uL  Mean Cell Volume : 69.3 fl  Mean Cell Hemoglobin : 22.2 pg  Mean Cell Hemoglobin Concentration : 32.1 gm/dL  Auto Neutrophil # : 6.72 K/uL  Auto Lymphocyte # : 0.80 K/uL  Auto Monocyte # : 0.49 K/uL  Auto Eosinophil # : 0.29 K/uL  Auto Basophil # : 0.02 K/uL  Auto Neutrophil % : 80.7 %  Auto Lymphocyte % : 9.6 %  Auto Monocyte % : 5.9 %  Auto Eosinophil % : 3.5 %  Auto Basophil % : 0.2 %    03-10    138  |  108  |  8   ----------------------------<  83  3.6   |  26  |  0.78    Ca    8.5      10 Mar 2020 18:19    TPro  7.3  /  Alb  3.4<L>  /  TBili  0.5  /  DBili  x   /  AST  14  /  ALT  18  /  AlkPhos  87  03-10           Neuroimaging:    CT Head (3/10/20): Chronic left PCA territory infarct      Assessment:  Hypertensive headache and chronic left PCA territory infarct secondary to hypertension vs. cardioembolic source      Recommendations:    1. Stroke workup  - CT Angiogram Head & Neck  - Transthoracic Echocardiogram with bubble study   - Telemetry  - Hemoglobin A1c  - Fasting lipid panel    2. Secondary stroke prevention  - Q4H Neurochecks & Vital signs  - Aspirin 81mg daily  - Clopidogrel 75mg daily x 21 days  - Atorvastatin 40mg QHS  - Treat BP if over 180/110 within first 24 hours of last seen normal; thereafter treat if over 120/80  - For BP management, consider starting verapamil instead of home medication, chlorthalidone, with which patient was not compliant. Verapamil can help treat both hypertension and chronic daily headache.  - PT/OT  - DVT ppx      Please contact the Neurology consult service with any questions.    Yg Cyr MD   of Neurology  [facility] at [facility] NEUROLOGY CONSULT NOTE    NAME:  GEORGIANA RAMIREZ    CHIEF COMPLAINT:  Patient is a 49y old  Female who presents with a chief complaint of R sided Numbness and weakness (10 Mar 2020 19:19)    HPI:  49 y F from home walks independently with Mhx of HTN (noncompliant with meds), Sickle cell trait( never received any transfusion) and PSHx of heart surgery?( for some clot) presented to ED with numbness, tingling and weakness of the Right side since yesterday. As per pt she was last seen normal 7am yesterday. Pt started having numbness of the Right leg yesterday  which resolved after some time. Today pt woke up around 7 am with dizziness so went back to sleep. Pt then woke up at 10:30 am and noticed Numbness and tingling to the right side of face, arm and leg associated with weakness. Symptoms improve after 1 hr. Pt is having severe one sided headache every day for last 1 month. Pt is noncompliant with her BP medication and did not check her BP at home   Pt denies any prior episode of numbness, h/o migraine, blurry vision, urinary or bladder incontinence, seizures, CP, SOB, cough, Abd pain, nausea, vomiting or any urinary symptoms.  ED course:   Vitals : Afebrile . 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   Utox -ve     NEURO HPI:  49 LHF presented with recurrent left-sided headache and right upper and lower extremity weakness and numbness, last seen normal yesterday before sleep (approximately 22:00 on 3/9/20).    PAST MEDICAL & SURGICAL HISTORY:  AS (sickle cell trait)  HTN (hypertension)  History of cholecystectomy  H/O heart surgery: 2015    MEDICATIONS:  acetaminophen   Tablet .. 650 milliGRAM(s) Oral every 6 hours PRN  amLODIPine   Tablet 5 milliGRAM(s) Oral daily  aspirin  chewable 81 milliGRAM(s) Oral daily  atorvastatin 80 milliGRAM(s) Oral at bedtime  enoxaparin Injectable 40 milliGRAM(s) SubCutaneous daily  influenza   Vaccine 0.5 milliLiter(s) IntraMuscular once  ketorolac   Injectable 30 milliGRAM(s) IV Push every 6 hours PRN  traMADol 50 milliGRAM(s) Oral every 8 hours PRN    ALLERGIES:  No Known Allergies    FAMILY HISTORY:  FH: HTN (hypertension)    SOCIAL HISTORY:  Denies Smoking, alcohol or drug use (10 Mar 2020 19:19)    REVIEW OF SYSTEMS:  GENERAL: No fever, weight changes, fatigue  EYES: No eye pain or discharge  EAR/NOSE/MOUTH/THROAT: No sinus or throat pain  NECK: No pain or stiffness  RESPIRATORY: No cough, wheezing, chills, or hemoptysis  CARDIOVASCULAR: No chest pain, palpitations, shortness of breath, or dyspnea on exertion  GASTROINTESTINAL: No abdominal pain, nausea, vomiting, hematemesis, diarrhea, or constipation  GENITOURINARY: No dysuria, frequency, hematuria, or incontinence  SKIN: No rashes or lesions  ENDOCRINE: No heat or cold intolerance  HEMATOLOGIC: No easy bruising or bleeding  PSYCHIATRIC: No depression, anxiety, or mood swings  MUSCULOSKELETAL: No joint pain or swelling  NEUROLOGICAL: As per HPI      OBJECTIVE:    Vital Signs Last 24 Hrs  T(C): 37.7 (11 Mar 2020 02:13), Max: 37.7 (11 Mar 2020 02:13)  T(F): 99.9 (11 Mar 2020 02:13), Max: 99.9 (11 Mar 2020 02:13)  HR: 99 (11 Mar 2020 02:13) (99 - 115)  BP: 125/83 (11 Mar 2020 02:13) (125/83 - 199/135)  RR: 18 (11 Mar 2020 02:13) (18 - 20)  SpO2: 99% (11 Mar 2020 02:13) (99% - 100%)    General Examination:  General: No acute distress  HEENT: Atraumatic, Normocephalic  Respiratory: CTA B/l.  No crackles, rhonchi, or wheezes.  Cardiovascular: RRR.  Normal S1 & S2.  Normal b/l radial and pedal pulses.    Neurological Examination:  General / Mental Status: AAO x 3.  No aphasia or dysarthria.  Naming and repetition intact.  Cranial Nerves: VFF x 4.  PERRL.  EOMI x 2, No nystagmus or diplopia.  B/l V1-V3 equal and intact to light touch and pinprick.  Symmetric facial movement and palate elevation.  B/l hearing equal to finger rub.  5/5 strength with b/l sternocleidomastoid & trapezius.  Midline tongue protrusion, with no atrophy or fasciculations.  Motor: Normal bulk & tone in all four extremities.  At least 3/5 strength throughout right upper and right lower extremities, both of which show downward drift and spasticity.  5/5 strength throughout left upper and left lower extremities, without downward drift and spasticity.  Sensory: Diminished to light touch and pinprick in right upper and right lower extremities.  Intact to light touch and pinprick in left upper and left lower extremities.  Reflex: 2+ and symmetric at b/l biceps, triceps, brachioradialis, patellae, and ankles.  Downgoing toes b/l.  Coordination: No dysmetria with b/l finger-to-nose and heel raise tests.  Symmetric rapid alternating movements b/l.  Gait and Romberg testing deferred due to right-sided weakness.    NIHSS Score:    LOC - 0  LOC Questions - 0  LOC Commands - 0  Gaze Preference - 0  Visual Fields - 0  Facial Palsy - 0  Motor Arm Left - 0  Motor Arm Right - 1  Motor Leg Left - 0  Motor Leg Right - 1  Limb Ataxia - 0  Sensory - 1  Language - 0  Speech - 0  Extinction - 0    NIHSS Score Total: 3    Modified [place] Scale: 2      Laboratory Values:    CBC Full  -  ( 10 Mar 2020 18:19 )  WBC Count : 8.33 K/uL  RBC Count : 5.40 M/uL  Hemoglobin : 12.0 g/dL  Hematocrit : 37.4 %  Platelet Count - Automated : 321 K/uL  Mean Cell Volume : 69.3 fl  Mean Cell Hemoglobin : 22.2 pg  Mean Cell Hemoglobin Concentration : 32.1 gm/dL  Auto Neutrophil # : 6.72 K/uL  Auto Lymphocyte # : 0.80 K/uL  Auto Monocyte # : 0.49 K/uL  Auto Eosinophil # : 0.29 K/uL  Auto Basophil # : 0.02 K/uL  Auto Neutrophil % : 80.7 %  Auto Lymphocyte % : 9.6 %  Auto Monocyte % : 5.9 %  Auto Eosinophil % : 3.5 %  Auto Basophil % : 0.2 %    03-10    138  |  108  |  8   ----------------------------<  83  3.6   |  26  |  0.78    Ca    8.5      10 Mar 2020 18:19    TPro  7.3  /  Alb  3.4<L>  /  TBili  0.5  /  DBili  x   /  AST  14  /  ALT  18  /  AlkPhos  87  03-10           Neuroimaging:    CT Head (3/10/20): Chronic left PCA territory infarct      Assessment:  49 LHF with hypertensive headache and chronic left PCA territory infarct secondary to hypertension vs. cardioembolic source      Recommendations:    1. Stroke workup  - CT Angiogram Head & Neck  - Transthoracic Echocardiogram with bubble study   - Telemetry  - Hemoglobin A1c  - Fasting lipid panel    2. Secondary stroke prevention  - Q4H Neurochecks & Vital signs  - Aspirin 81mg daily  - Clopidogrel 75mg daily x 21 days  - Atorvastatin 40mg QHS  - Treat BP if over 180/110 within first 24 hours of last seen normal; thereafter treat if over 120/80  - For BP management, consider starting verapamil instead of home medication, chlorthalidone, with which patient was not compliant. Verapamil can help treat both hypertension and chronic daily headache.  - PT/OT  - DVT ppx      Please contact the Neurology consult service with any questions.    Yg Cyr MD   of Neurology  [facility] at [facility]

## 2020-03-10 NOTE — H&P ADULT - HISTORY OF PRESENT ILLNESS
49 y F from home walks independently with Mhx of HTN ( non compliant with meds), Sickle cell trait( never received any transfusion) and PSHx of heart surgery?( for some clot) presented to ED with Numbness, tingling and weakness of the Right side since yesterday. As per pt she was last seen normal 7am yesterday. Pt started having Numbness of the Right leg yesterday  which resolved after some time. Today pt woke up around 7 am with dizziness so went back to sleep. Pt then woke up at 10:30 am and noticed Numbness and tingling to the right side of face, arm and leg associated with weakness . Symptoms improve after 1 hr. Pt is having severe one sided headache every day for last 1 month. Pt is non compliant with her BP medication and donot check her BP at home   Pt denies any prior episode of numbness , h/o migraine, blurry vision, urinary or bladder incontinence ,seizures, CP, SOB, cough , Abd pain , nausea, vomiting or any urinary symptoms     ED course:   Vitals : Afebrile . 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   Utox -ve     SH : Denies Smoking, alcohol or drug use

## 2020-03-10 NOTE — H&P ADULT - NSHPPHYSICALEXAM_GEN_ALL_CORE
GENERAL: NAD  EYES: EOMI, PERRLA, no nystagmus , visual field wnl   NECK: Supple, No JVD  CHEST/LUNG: Clear to auscultation b/l  No rales, rhonchi, wheezing   HEART: Regular rate and rhythm; No murmurs, +ve S1 S2   ABDOMEN: Soft, Nontender, Nondistended; Bowel sounds present  NERVOUS SYSTEM:  Alert & Oriented X3, Motor : 4/5 R UE n LE . 5/5 L UE n LE . Dec sensations on the R side.   EXTREMITIES:   No clubbing, cyanosis, or edema  LYMPH NODES : non palpable   PSYCH: normal mood and affect

## 2020-03-10 NOTE — H&P ADULT - ASSESSMENT
49 y F from home walks independently with Mhx of HTN ( non compliant with meds), Sickle cell trait( never received any transfusion) and PSHx of heart surgery?( for some clot) presented to ED with Numbness, tingling and weakness of the Right side    Pt will be admitted to tele for CVA

## 2020-03-10 NOTE — H&P ADULT - PROBLEM SELECTOR PLAN 2
Pt non compliant with her med  - Goal BP around 150   -s/p hydralazine 10 mg X1   -started on amlodipine 5 mg   -monitor BP , can give IV pushes if BP elevated

## 2020-03-10 NOTE — H&P ADULT - PROBLEM SELECTOR PLAN 1
p/w Right sided numbness,tingling and weakness   - NIHSS 1-2  - CTH : subacute to chronic L PCA infarct , no hemorrhage   - ECG : NSR   - passed dysphagia screen   - started on diet   - started on Asp 81 mg and high intensity statins    - Keep BP around 150   - cont tele  - f/u CD, Transthoracic echo with bubble study  - f/u A1c , Lipid profile , Vit B12 ,   - f/u PT eval , official SnS   - Neuro consult Dr Cyr

## 2020-03-11 LAB
24R-OH-CALCIDIOL SERPL-MCNC: 12.9 NG/ML — LOW (ref 30–80)
ANION GAP SERPL CALC-SCNC: 7 MMOL/L — SIGNIFICANT CHANGE UP (ref 5–17)
BASOPHILS # BLD AUTO: 0.02 K/UL — SIGNIFICANT CHANGE UP (ref 0–0.2)
BASOPHILS NFR BLD AUTO: 0.3 % — SIGNIFICANT CHANGE UP (ref 0–2)
BUN SERPL-MCNC: 9 MG/DL — SIGNIFICANT CHANGE UP (ref 7–18)
CALCIUM SERPL-MCNC: 8.4 MG/DL — SIGNIFICANT CHANGE UP (ref 8.4–10.5)
CHLORIDE SERPL-SCNC: 107 MMOL/L — SIGNIFICANT CHANGE UP (ref 96–108)
CHOLEST SERPL-MCNC: 140 MG/DL — SIGNIFICANT CHANGE UP (ref 10–199)
CK MB BLD-MCNC: <1.4 % — SIGNIFICANT CHANGE UP (ref 0–3.5)
CK MB CFR SERPL CALC: <1 NG/ML — SIGNIFICANT CHANGE UP (ref 0–3.6)
CK SERPL-CCNC: 70 U/L — SIGNIFICANT CHANGE UP (ref 21–215)
CO2 SERPL-SCNC: 22 MMOL/L — SIGNIFICANT CHANGE UP (ref 22–31)
CREAT SERPL-MCNC: 0.82 MG/DL — SIGNIFICANT CHANGE UP (ref 0.5–1.3)
EOSINOPHIL # BLD AUTO: 0.1 K/UL — SIGNIFICANT CHANGE UP (ref 0–0.5)
EOSINOPHIL NFR BLD AUTO: 1.4 % — SIGNIFICANT CHANGE UP (ref 0–6)
GLUCOSE BLDC GLUCOMTR-MCNC: 102 MG/DL — HIGH (ref 70–99)
GLUCOSE SERPL-MCNC: 100 MG/DL — HIGH (ref 70–99)
HBA1C BLD-MCNC: 5.7 % — HIGH (ref 4–5.6)
HCT VFR BLD CALC: 39.3 % — SIGNIFICANT CHANGE UP (ref 34.5–45)
HDLC SERPL-MCNC: 50 MG/DL — SIGNIFICANT CHANGE UP
HGB BLD-MCNC: 12.8 G/DL — SIGNIFICANT CHANGE UP (ref 11.5–15.5)
IMM GRANULOCYTES NFR BLD AUTO: 0.4 % — SIGNIFICANT CHANGE UP (ref 0–1.5)
LIPID PNL WITH DIRECT LDL SERPL: 81 MG/DL — SIGNIFICANT CHANGE UP
LYMPHOCYTES # BLD AUTO: 0.69 K/UL — LOW (ref 1–3.3)
LYMPHOCYTES # BLD AUTO: 9.4 % — LOW (ref 13–44)
MAGNESIUM SERPL-MCNC: 2.3 MG/DL — SIGNIFICANT CHANGE UP (ref 1.6–2.6)
MCHC RBC-ENTMCNC: 22.3 PG — LOW (ref 27–34)
MCHC RBC-ENTMCNC: 32.6 GM/DL — SIGNIFICANT CHANGE UP (ref 32–36)
MCV RBC AUTO: 68.3 FL — LOW (ref 80–100)
MONOCYTES # BLD AUTO: 0.69 K/UL — SIGNIFICANT CHANGE UP (ref 0–0.9)
MONOCYTES NFR BLD AUTO: 9.4 % — SIGNIFICANT CHANGE UP (ref 2–14)
NEUTROPHILS # BLD AUTO: 5.84 K/UL — SIGNIFICANT CHANGE UP (ref 1.8–7.4)
NEUTROPHILS NFR BLD AUTO: 79.1 % — HIGH (ref 43–77)
NRBC # BLD: 0 /100 WBCS — SIGNIFICANT CHANGE UP (ref 0–0)
PHOSPHATE SERPL-MCNC: 2.3 MG/DL — LOW (ref 2.5–4.5)
PLATELET # BLD AUTO: 343 K/UL — SIGNIFICANT CHANGE UP (ref 150–400)
POTASSIUM SERPL-MCNC: 3.3 MMOL/L — LOW (ref 3.5–5.3)
POTASSIUM SERPL-SCNC: 3.3 MMOL/L — LOW (ref 3.5–5.3)
RBC # BLD: 5.75 M/UL — HIGH (ref 3.8–5.2)
RBC # FLD: 18 % — HIGH (ref 10.3–14.5)
SODIUM SERPL-SCNC: 136 MMOL/L — SIGNIFICANT CHANGE UP (ref 135–145)
TOTAL CHOLESTEROL/HDL RATIO MEASUREMENT: 2.8 RATIO — LOW (ref 3.3–7.1)
TRIGL SERPL-MCNC: 46 MG/DL — SIGNIFICANT CHANGE UP (ref 10–149)
TROPONIN I SERPL-MCNC: <0.015 NG/ML — SIGNIFICANT CHANGE UP (ref 0–0.04)
TSH SERPL-MCNC: 0.56 UU/ML — SIGNIFICANT CHANGE UP (ref 0.34–4.82)
VIT B12 SERPL-MCNC: 522 PG/ML — SIGNIFICANT CHANGE UP (ref 232–1245)
WBC # BLD: 7.37 K/UL — SIGNIFICANT CHANGE UP (ref 3.8–10.5)
WBC # FLD AUTO: 7.37 K/UL — SIGNIFICANT CHANGE UP (ref 3.8–10.5)

## 2020-03-11 PROCEDURE — 70496 CT ANGIOGRAPHY HEAD: CPT | Mod: 26

## 2020-03-11 PROCEDURE — 99233 SBSQ HOSP IP/OBS HIGH 50: CPT

## 2020-03-11 PROCEDURE — 70498 CT ANGIOGRAPHY NECK: CPT | Mod: 26

## 2020-03-11 RX ORDER — VERAPAMIL HCL 240 MG
40 CAPSULE, EXTENDED RELEASE PELLETS 24 HR ORAL THREE TIMES A DAY
Refills: 0 | Status: DISCONTINUED | OUTPATIENT
Start: 2020-03-11 | End: 2020-03-11

## 2020-03-11 RX ORDER — POTASSIUM CHLORIDE 20 MEQ
40 PACKET (EA) ORAL EVERY 4 HOURS
Refills: 0 | Status: DISCONTINUED | OUTPATIENT
Start: 2020-03-11 | End: 2020-03-11

## 2020-03-11 RX ORDER — ERGOCALCIFEROL 1.25 MG/1
50000 CAPSULE ORAL
Refills: 0 | Status: DISCONTINUED | OUTPATIENT
Start: 2020-03-11 | End: 2020-03-18

## 2020-03-11 RX ORDER — INFLUENZA VIRUS VACCINE 15; 15; 15; 15 UG/.5ML; UG/.5ML; UG/.5ML; UG/.5ML
0.5 SUSPENSION INTRAMUSCULAR ONCE
Refills: 0 | Status: COMPLETED | OUTPATIENT
Start: 2020-03-11 | End: 2020-03-18

## 2020-03-11 RX ORDER — METOPROLOL TARTRATE 50 MG
5 TABLET ORAL ONCE
Refills: 0 | Status: COMPLETED | OUTPATIENT
Start: 2020-03-11 | End: 2020-03-11

## 2020-03-11 RX ORDER — POTASSIUM CHLORIDE 20 MEQ
40 PACKET (EA) ORAL ONCE
Refills: 0 | Status: COMPLETED | OUTPATIENT
Start: 2020-03-11 | End: 2020-03-11

## 2020-03-11 RX ORDER — TRAMADOL HYDROCHLORIDE 50 MG/1
50 TABLET ORAL EVERY 8 HOURS
Refills: 0 | Status: DISCONTINUED | OUTPATIENT
Start: 2020-03-11 | End: 2020-03-15

## 2020-03-11 RX ORDER — VERAPAMIL HCL 240 MG
40 CAPSULE, EXTENDED RELEASE PELLETS 24 HR ORAL THREE TIMES A DAY
Refills: 0 | Status: DISCONTINUED | OUTPATIENT
Start: 2020-03-11 | End: 2020-03-12

## 2020-03-11 RX ADMIN — Medication 81 MILLIGRAM(S): at 11:24

## 2020-03-11 RX ADMIN — Medication 40 MILLIGRAM(S): at 22:12

## 2020-03-11 RX ADMIN — ATORVASTATIN CALCIUM 80 MILLIGRAM(S): 80 TABLET, FILM COATED ORAL at 22:12

## 2020-03-11 RX ADMIN — Medication 40 MILLIEQUIVALENT(S): at 09:06

## 2020-03-11 RX ADMIN — Medication 40 MILLIGRAM(S): at 19:51

## 2020-03-11 RX ADMIN — Medication 5 MILLIGRAM(S): at 18:46

## 2020-03-11 RX ADMIN — ERGOCALCIFEROL 50000 UNIT(S): 1.25 CAPSULE ORAL at 18:46

## 2020-03-11 RX ADMIN — ENOXAPARIN SODIUM 40 MILLIGRAM(S): 100 INJECTION SUBCUTANEOUS at 11:24

## 2020-03-11 NOTE — PROGRESS NOTE ADULT - SUBJECTIVE AND OBJECTIVE BOX
PGY1 Note discussed with Supervising Resident and Primary Attending.    Patient is a 49y old  Female who presents with a chief complaint of R sided Numbness and weakness (10 Mar 2020 21:30)      INTERVAL HPI/OVERNIGHT EVENTS :    ***********************************************************************************************************    MEDICATIONS  (STANDING):  amLODIPine   Tablet 5 milliGRAM(s) Oral daily  aspirin  chewable 81 milliGRAM(s) Oral daily  atorvastatin 80 milliGRAM(s) Oral at bedtime  enoxaparin Injectable 40 milliGRAM(s) SubCutaneous daily  influenza   Vaccine 0.5 milliLiter(s) IntraMuscular once    MEDICATIONS  (PRN):  acetaminophen   Tablet .. 650 milliGRAM(s) Oral every 6 hours PRN Temp greater or equal to 38C (100.4F), Mild Pain (1 - 3)  ketorolac   Injectable 30 milliGRAM(s) IV Push every 6 hours PRN Severe Pain (7 - 10)  traMADol 50 milliGRAM(s) Oral every 8 hours PRN Severe Pain (7 - 10)      ***********************************************************************************************************    Allergies    No Known Allergies    Intolerances        ***********************************************************************************************************    REVIEW OF SYSTEMS :  * CONSTITUTIONAL      : No Fever, Weight loss, or Fatigue  * EYES                             : No eye pain , Visual disturbances or Discharge  * RESPIRATORY             : No Cough, Wheezing, Chills or Hemoptysis; No shortness of breath  * CARDIOVASCULAR     : No Chest pain, Palpitations, Dizziness, or Leg swelling  * GASTROINTESTINAL  : No Abdominal or Epigastric pain. No Nausea, Vomiting or Hematemesis; No Diarrhea or Constipation. No Melena or Hematochezia.  * GENITOURINARY        : No Dysuria , Frequency , Haematuria   * NEUROLOGICAL          : No Headaches, Memory loss, Loss of strength, Numbness, or Tremors  * MUSCULOSKELETAL   : No Joint pain  * PSYCHIATRY                 : No Depression or Anxiety   * HEME/LYMPH              : No Easy Bruising or Bleeding gums  * SKIN                               : No Itching, Burning, Rashes, or Lesions     ***********************************************************************************************************    Vital Signs Last 24 Hrs  T(C): 37.5 (11 Mar 2020 07:59), Max: 37.7 (11 Mar 2020 02:13)  T(F): 99.5 (11 Mar 2020 07:59), Max: 99.9 (11 Mar 2020 02:13)  HR: 102 (11 Mar 2020 07:59) (99 - 115)  BP: 154/97 (11 Mar 2020 07:59) (125/83 - 199/135)  BP(mean): --  RR: 18 (11 Mar 2020 07:59) (18 - 20)  SpO2: 100% (11 Mar 2020 07:59) (99% - 100%)    ***********************************************************************************************************    PHYSICAL EXAM :  * GENERAL                 : NAD, Well-groomed, Well-developed  * HEAD                       :  Atraumatic, Normocephalic  * EYES                         : EOMI, PERRLA, Conjunctiva and Sclera clear  * ENT                           : Moist Mucous Membranes  * NECK                         : Supple, No JVD, Normal Thyroid  * CHEST/LUNG           : Clear to Auscultation bilaterally; No Rales, Rhonchi, Wheezing or Rubs  * HEART                       : Regular Rate and Rhythm; No murmurs, Rubs or gallops  * ABDOMEN                : Soft, Non-tender, Non-distended; Bowel Sounds present  * NERVOUS SYSTEM  :  Alert & Oriented X3, Good Concentration; Motor Strength 5/5 B/L UL LL ; DTRs 2+ Intact and Symmetric  * EXTREMITIES            :  2+ Peripheral Pulses, No clubbing, cyanosis, or edema  * SKIN                           : No Rashes or Lesions    **********************************************************************************************************  LABS:                          12.0   8.33  )-----------( 321      ( 10 Mar 2020 18:19 )             37.4     03-10    138  |  108  |  8   ----------------------------<  83  3.6   |  26  |  0.78    Ca    8.5      10 Mar 2020 18:19    TPro  7.3  /  Alb  3.4<L>  /  TBili  0.5  /  DBili  x   /  AST  14  /  ALT  18  /  AlkPhos  87  03-10    PT/INR - ( 10 Mar 2020 18:19 )   PT: 12.6 sec;   INR: 1.13 ratio         PTT - ( 10 Mar 2020 18:19 )  PTT:26.5 sec    CAPILLARY BLOOD GLUCOSE      POCT Blood Glucose.: 102 mg/dL (11 Mar 2020 06:48)  POCT Blood Glucose.: 100 mg/dL (10 Mar 2020 17:12)      **********************************************************************************************************    RADIOLOGY & ADDITIONAL TESTS:   No radiological imaging was required    Imaging Personally Reviewed   :  [ ] YES  [ ] NO    Consultant(s) Notes Reviewed :  [ ] YES  [ ] NO PGY1 Note discussed with Supervising Resident and Primary Attending.    Patient is a 49y old  Female who presents with a chief complaint of R sided Numbness and weakness (10 Mar 2020 21:30)      INTERVAL HPI/OVERNIGHT EVENTS :    Pt NIHSS score increased from 3 to 5 but pt was not a candidate for tpa   Pt has weakness on right sideof boday anad right nasolabial fold is flat now.      ***********************************************************************************************************    MEDICATIONS  (STANDING):  amLODIPine   Tablet 5 milliGRAM(s) Oral daily  aspirin  chewable 81 milliGRAM(s) Oral daily  atorvastatin 80 milliGRAM(s) Oral at bedtime  enoxaparin Injectable 40 milliGRAM(s) SubCutaneous daily  influenza   Vaccine 0.5 milliLiter(s) IntraMuscular once    MEDICATIONS  (PRN):  acetaminophen   Tablet .. 650 milliGRAM(s) Oral every 6 hours PRN Temp greater or equal to 38C (100.4F), Mild Pain (1 - 3)  ketorolac   Injectable 30 milliGRAM(s) IV Push every 6 hours PRN Severe Pain (7 - 10)  traMADol 50 milliGRAM(s) Oral every 8 hours PRN Severe Pain (7 - 10)      ***********************************************************************************************************    Allergies    No Known Allergies    Intolerances        ***********************************************************************************************************    REVIEW OF SYSTEMS :  * CONSTITUTIONAL      : No Fever,   * EYES                             : No eye pain ,  * RESPIRATORY             : No Cough,  * CARDIOVASCULAR     : No Chest pain,  * GASTROINTESTINAL  : No Abdominal or Epigastric pain. No Nausea, Vomiting  * GENITOURINARY        : No Dysuria ,  * NEUROLOGICAL          : No Headaches, pt has weakness on rt side   * MUSCULOSKELETAL   : No Joint pain  * PSYCHIATRY                 : No Depression   * HEME/LYMPH              : No Easy Bruising  * SKIN                               : No Itching, Burning, Rashes,     ***********************************************************************************************************    Vital Signs Last 24 Hrs  T(C): 37.5 (11 Mar 2020 07:59), Max: 37.7 (11 Mar 2020 02:13)  T(F): 99.5 (11 Mar 2020 07:59), Max: 99.9 (11 Mar 2020 02:13)  HR: 102 (11 Mar 2020 07:59) (99 - 115)  BP: 154/97 (11 Mar 2020 07:59) (125/83 - 199/135)  BP(mean): --  RR: 18 (11 Mar 2020 07:59) (18 - 20)  SpO2: 100% (11 Mar 2020 07:59) (99% - 100%)    ***********************************************************************************************************    PHYSICAL EXAM :  * GENERAL                 : NAD, Well-groomed, Well-developed  * HEAD                       :  Atraumatic, Normocephalic  * EYES                         :  Conjunctiva and Sclera clear  * ENT                           : Moist Mucous Membranes  * NECK                         : Supple, No JVD, Normal Thyroid  * CHEST/LUNG           : Clear to Auscultation bilaterally; No Rales,  * HEART                       : Regular Rate and Rhythm; No murmurs,  * ABDOMEN                : Soft, Non-tender, Non-distended; Bowel Sounds present  * NERVOUS SYSTEM  :  Alert & Oriented X3, Good Concentration; right sided weakness and flatening of right nasolabial fold  * EXTREMITIES            :  2+ Peripheral Pulses, No clubbing, cyanosis  * SKIN                           : No Rashes    **********************************************************************************************************  LABS:                          12.0   8.33  )-----------( 321      ( 10 Mar 2020 18:19 )             37.4     03-10    138  |  108  |  8   ----------------------------<  83  3.6   |  26  |  0.78    Ca    8.5      10 Mar 2020 18:19    TPro  7.3  /  Alb  3.4<L>  /  TBili  0.5  /  DBili  x   /  AST  14  /  ALT  18  /  AlkPhos  87  03-10    PT/INR - ( 10 Mar 2020 18:19 )   PT: 12.6 sec;   INR: 1.13 ratio         PTT - ( 10 Mar 2020 18:19 )  PTT:26.5 sec    CAPILLARY BLOOD GLUCOSE      POCT Blood Glucose.: 102 mg/dL (11 Mar 2020 06:48)  POCT Blood Glucose.: 100 mg/dL (10 Mar 2020 17:12)      **********************************************************************************************************    RADIOLOGY & ADDITIONAL TESTS:   No radiological imaging was required    Imaging Personally Reviewed   :  [ x] YES  [ ] NO    Consultant(s) Notes Reviewed :  [x ] YES  [ ] NO

## 2020-03-11 NOTE — CHART NOTE - NSCHARTNOTEFT_GEN_A_CORE
HPI: Patient is a 49 y F from home walks independently with Mhx of HTN ( non compliant with meds), Sickle cell trait( never received any transfusion) and PSHx of heart surgery?( for some clot) presented to ED with Numbness, tingling and weakness of the Right side. CT head found Small subacute to chronic left PCA infarct. No acute findings. Pt was admitted to [facility] for CVA.     RRT called for pt with worsening Rt side weakness.  Pt seen and examined at bedside. VS: /93, , RR 18, temp 99.2, SO2 94% on RA, , EKG noted NSR, no acute st-t changes. NIHSS revaluation is 5.      REVIEW OF SYSTEMS:  Respiratory: Denies cough, wheezing, chills, hemoptysis, shortness of breath, difficulty breathing  Cardiovascular: Denies chest pain, palpitations, dizziness, leg swelling  Gastrointestinal: Denies abdominal pain, nausea, vomiting, hematemesis, diarrhea, constipation, melena, hematochezia  Genitourinary: Denies dysuria, frequency, hematuria, retention, incontinence  Neurological: Denies headaches, memory loss, loss of strength, numbness, tremors    VITALS:  T(C): 37.3 (03-11-20 @ 05:05), Max: 37.7 (03-11-20 @ 02:13)  HR: 99 (03-11-20 @ 05:05) (99 - 115)  BP: 156/83 (03-11-20 @ 05:05) (125/83 - 199/135)  RR: 18 (03-11-20 @ 05:05) (18 - 20)  SpO2: 100% (03-11-20 @ 05:05) (99% - 100%)  Wt(kg): --    PHYSICAL EXAM:  General: NAD, well-groomed, well-developed.  Eyes: PERRLA, EOMI. Conjunctiva and sclera clear.  Lungs: Airway patent. CTA B/L. Normal breath sounds. No wheezes, rales, rhonchi.  Heart: RRR. Normal S1/S2. No murmurs appreciated.  Abdomen: Soft, NT/ND. Normoactive BS x 4 quadrants.  Neurological:  AAOx3. Good concentration. Strength 5/5 B/L upper and lower extremities. No pronator drift.  Extremities:  2+ B/L peripheral pulses. No clubbing, cyanosis, or edema.    LABS:  CAPILLARY BLOOD GLUCOSE      POCT Blood Glucose.: 102 mg/dL (11 Mar 2020 06:48)  POCT Blood Glucose.: 100 mg/dL (10 Mar 2020 17:12)                          12.0   8.33  )-----------( 321      ( 10 Mar 2020 18:19 )             37.4     03-10    138  |  108  |  8   ----------------------------<  83  3.6   |  26  |  0.78    Ca    8.5      10 Mar 2020 18:19    TPro  7.3  /  Alb  3.4<L>  /  TBili  0.5  /  DBili  x   /  AST  14  /  ALT  18  /  AlkPhos  87  03-10        PT/INR - ( 10 Mar 2020 18:19 )   PT: 12.6 sec;   INR: 1.13 ratio         PTT - ( 10 Mar 2020 18:19 )  PTT:26.5 sec  LIVER FUNCTIONS - ( 10 Mar 2020 18:19 )  Alb: 3.4 g/dL / Pro: 7.3 g/dL / ALK PHOS: 87 U/L / ALT: 18 U/L DA / AST: 14 U/L / GGT: x                                     ASSESSMENT:  Patient is a 49 y F from home walks independently with Mhx of HTN ( non compliant with meds), Sickle cell trait( never received any transfusion) and PSHx of heart surgery?( for some clot) presented to ED with Numbness, tingling and weakness of the Right side. NIHSS 3 on admission, CT head found Small subacute to chronic left PCA infarct. No acute findings. Pt was admitted to tele for CVA. RRT called for pt with worsening Rt side weakness.  Pt seen and examined at bedside. VS: /93, , RR 18, temp 99.2, SO2 94% on RA, , EKG noted NSR, no acute st-t changes. NIHSS revaluation is 5.       PLAN:  # CVA   - will get CT angio head/neck   - Not a candidate for tPA   -Continue asa and lipitor   -Follow up am labs, Troponin   -Plan discussed with Dr. Yo and Ger HPI: Patient is a 49 y F from home walks independently with Mhx of HTN ( non compliant with meds), Sickle cell trait( never received any transfusion) and PSHx of heart surgery?( for some clot) presented to ED with Numbness, tingling and weakness of the Right side. CT head found Small subacute to chronic left PCA infarct. No acute findings. Pt was admitted to [facility] for CVA.     RRT called for pt with worsening Rt side weakness.  Pt seen and examined at bedside. VS: /93, , RR 18, temp 99.2, SO2 94% on RA, , EKG noted NSR, no acute st-t changes. NIHSS revaluation is 5.      REVIEW OF SYSTEMS:  Respiratory: Denies cough, wheezing, chills, hemoptysis, shortness of breath, difficulty breathing  Cardiovascular: Denies chest pain, palpitations, dizziness, leg swelling  Gastrointestinal: Denies abdominal pain, nausea, vomiting, hematemesis, diarrhea, constipation, melena, hematochezia  Genitourinary: Denies dysuria, frequency, hematuria, retention, incontinence  Neurological: Denies headaches, memory loss, loss of strength, numbness, tremors    VITALS:  T(C): 37.3 (03-11-20 @ 05:05), Max: 37.7 (03-11-20 @ 02:13)  HR: 99 (03-11-20 @ 05:05) (99 - 115)  BP: 156/83 (03-11-20 @ 05:05) (125/83 - 199/135)  RR: 18 (03-11-20 @ 05:05) (18 - 20)  SpO2: 100% (03-11-20 @ 05:05) (99% - 100%)  Wt(kg): --    PHYSICAL EXAM:  General: NAD, well-groomed, well-developed.  Eyes: PERRLA, EOMI. Conjunctiva and sclera clear.  Lungs: Airway patent. CTA B/L. Normal breath sounds. No wheezes, rales, rhonchi.  Heart: RRR. Normal S1/S2. No murmurs appreciated.  Abdomen: Soft, NT/ND. Normoactive BS x 4 quadrants.  Neurological:  AAOx3. Good concentration. Strength 5/5 B/L upper and lower extremities. No pronator drift.  Extremities:  2+ B/L peripheral pulses. No clubbing, cyanosis, or edema.    LABS:  CAPILLARY BLOOD GLUCOSE      POCT Blood Glucose.: 102 mg/dL (11 Mar 2020 06:48)  POCT Blood Glucose.: 100 mg/dL (10 Mar 2020 17:12)                          12.0   8.33  )-----------( 321      ( 10 Mar 2020 18:19 )             37.4     03-10    138  |  108  |  8   ----------------------------<  83  3.6   |  26  |  0.78    Ca    8.5      10 Mar 2020 18:19    TPro  7.3  /  Alb  3.4<L>  /  TBili  0.5  /  DBili  x   /  AST  14  /  ALT  18  /  AlkPhos  87  03-10        PT/INR - ( 10 Mar 2020 18:19 )   PT: 12.6 sec;   INR: 1.13 ratio         PTT - ( 10 Mar 2020 18:19 )  PTT:26.5 sec  LIVER FUNCTIONS - ( 10 Mar 2020 18:19 )  Alb: 3.4 g/dL / Pro: 7.3 g/dL / ALK PHOS: 87 U/L / ALT: 18 U/L DA / AST: 14 U/L / GGT: x                                     ASSESSMENT:  Patient is a 49 y F from home walks independently with Mhx of HTN ( non compliant with meds), Sickle cell trait( never received any transfusion) and PSHx of heart surgery?( for some clot) presented to ED with Numbness, tingling and weakness of the Right side. NIHSS 3 on admission, CT head found Small subacute to chronic left PCA infarct. No acute findings. Pt was admitted to tele for CVA. RRT called for pt with worsening Rt side weakness.  Pt seen and examined at bedside. VS: /93, , RR 18, temp 99.2, SO2 94% on RA, , EKG noted NSR, no acute st-t changes. NIHSS revaluation is 5.       PLAN:  # CVA   - will get CT angio head/neck   - Not a candidate for tPA   -Continue asa and lipitor   -Follow up am labs, Troponin   -Plan discussed with neuro

## 2020-03-11 NOTE — PROGRESS NOTE ADULT - SUBJECTIVE AND OBJECTIVE BOX
HPI:  49 y F from home walks independently with Mhx of HTN ( non compliant with meds), Sickle cell trait( never received any transfusion) and PSHx of heart surgery?( for some clot) presented to ED with Numbness, tingling and weakness of the Right side since yesterday. As per pt she was last seen normal 7am yesterday. Pt started having Numbness of the Right leg yesterday  which resolved after some time. Today pt woke up around 7 am with dizziness so went back to sleep. Pt then woke up at 10:30 am and noticed Numbness and tingling to the right side of face, arm and leg associated with weakness . Symptoms improve after 1 hr. Pt is having severe one sided headache every day for last 1 month. Pt is non compliant with her BP medication and donot check her BP at home   Pt denies any prior episode of numbness , h/o migraine, blurry vision, urinary or bladder incontinence ,seizures, CP, SOB, cough , Abd pain , nausea, vomiting or any urinary symptoms     ED course:   Vitals : Afebrile . 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   Utox -ve     SH : Denies Smoking, alcohol or drug use (10 Mar 2020 19:19)      Patient is a 49y old  Female who presents with a chief complaint of R sided Numbness and weakness (11 Mar 2020 08:23)      INTERVAL HPI/OVERNIGHT EVENTS:  T(C): 37.2 (03-11-20 @ 11:43), Max: 37.7 (03-11-20 @ 02:13)  HR: 102 (03-11-20 @ 11:43) (99 - 115)  BP: 157/96 (03-11-20 @ 11:43) (125/83 - 199/135)  RR: 18 (03-11-20 @ 11:43) (18 - 20)  SpO2: 96% (03-11-20 @ 11:43) (96% - 100%)  Wt(kg): --  I&O's Summary      REVIEW OF SYSTEMS: denies fever, chills, SOB, palpitations, chest pain, abdominal pain, nausea, vomitting, diarrhea, constipation, dizziness    MEDICATIONS  (STANDING):  amLODIPine   Tablet 5 milliGRAM(s) Oral daily  aspirin  chewable 81 milliGRAM(s) Oral daily  atorvastatin 80 milliGRAM(s) Oral at bedtime  enoxaparin Injectable 40 milliGRAM(s) SubCutaneous daily  influenza   Vaccine 0.5 milliLiter(s) IntraMuscular once    MEDICATIONS  (PRN):  acetaminophen   Tablet .. 650 milliGRAM(s) Oral every 6 hours PRN Temp greater or equal to 38C (100.4F), Mild Pain (1 - 3)  ketorolac   Injectable 30 milliGRAM(s) IV Push every 6 hours PRN Severe Pain (7 - 10)  traMADol 50 milliGRAM(s) Oral every 8 hours PRN Moderate Pain (4 - 6)      PHYSICAL EXAM:  GENERAL: NAD, well-groomed, well-developed  HEAD:  Atraumatic, Normocephalic  EYES: EOMI, PERRLA, conjunctiva and sclera clear  ENMT: No tonsillar erythema, exudates, or enlargement; Moist mucous membranes, Good dentition, No lesions  NECK: Supple, No JVD, Normal thyroid  NERVOUS SYSTEM:  Alert & Oriented X3, Good concentration; Motor Strength 5/5 B/L upper and lower extremities; DTRs 2+ intact and symmetric  CHEST/LUNG: Clear to percussion bilaterally; No rales, rhonchi, wheezing, or rubs  HEART: Regular rate and rhythm; No murmurs, rubs, or gallops  ABDOMEN: Soft, Nontender, Nondistended; Bowel sounds present  EXTREMITIES:  2+ Peripheral Pulses, No clubbing, cyanosis, or edema  LYMPH: No lymphadenopathy noted  SKIN: No rashes or lesions  LABS:                        12.8   7.37  )-----------( 343      ( 11 Mar 2020 08:20 )             39.3     03-11    136  |  107  |  9   ----------------------------<  100<H>  3.3<L>   |  22  |  0.82    Ca    8.4      11 Mar 2020 08:20  Phos  2.3     03-11  Mg     2.3     03-11    TPro  7.3  /  Alb  3.4<L>  /  TBili  0.5  /  DBili  x   /  AST  14  /  ALT  18  /  AlkPhos  87  03-10    PT/INR - ( 10 Mar 2020 18:19 )   PT: 12.6 sec;   INR: 1.13 ratio         PTT - ( 10 Mar 2020 18:19 )  PTT:26.5 sec    CAPILLARY BLOOD GLUCOSE      POCT Blood Glucose.: 102 mg/dL (11 Mar 2020 06:48)  POCT Blood Glucose.: 100 mg/dL (10 Mar 2020 17:12)

## 2020-03-11 NOTE — PROGRESS NOTE ADULT - PROBLEM SELECTOR PLAN 1
p/w Right sided numbness,tingling and weakness   - NIHSS 1-2  - CTH : subacute to chronic L PCA infarct , no hemorrhage   - ECG : NSR   - passed dysphagia screen   - started on diet   - started on Asp 81 mg and high intensity statins    - Keep BP around 150   - cont tele  - f/u CD, Transthoracic echo with bubble study  - f/u A1c , Lipid profile , Vit B12 ,   - f/u PT eval , official SnS   - Neuro consult Dr Cyr p/w Right sided numbness,tingling and weakness   - NIHSS increased overnight to 5, pt not a candidate for TPA  - CTH : subacute to chronic L PCA infarct , no hemorrhage   - ECG : NSR   - S/S EVal  - started on Asp 81 mg and high intensity statins    - cont tele  - f/u CD, Transthoracic echo with bubble study  ct angio head and neck, us carotid  -  A1c 5.7, Lipid profile wnl ,   f/u Vit B12 ,   - f/u PT eval , official SnS   -neuro checks q4  - Neuro consult Dr Cyr

## 2020-03-11 NOTE — PROGRESS NOTE ADULT - SUBJECTIVE AND OBJECTIVE BOX
Patient's headaches have improved.  Patient's right-sided weakness worsened this morning, but have improved as of this evening.  Patient still has difficulty walking due to right-sided weakness    Neuro exam notable for right-sided hemiparesis.    CTA Head/Neck showed no intracranial or neck vessel abnormalities.      Recs:  - Continue aspirin 81mg daily & atorvastatin 80mg QHS  - Add clopidogrel 75mg daily x 21 days  - Continue verapamil 40mg TID for hypertension and headache prophylaxis  - Treat BP > 120/80  - PT/OT      NOTE TO BE COMPLETED - PLEASE REFER TO ABOVE ONLY AND IGNORE INFORMATION BELOW    Neurology Follow up note    Name  GEORGIANA RAMIREZ    HPI:  49 y F from home walks independently with Mhx of HTN ( non compliant with meds), Sickle cell trait( never received any transfusion) and PSHx of heart surgery?( for some clot) presented to ED with Numbness, tingling and weakness of the Right side since yesterday. As per pt she was last seen normal 7am yesterday. Pt started having Numbness of the Right leg yesterday  which resolved after some time. Today pt woke up around 7 am with dizziness so went back to sleep. Pt then woke up at 10:30 am and noticed Numbness and tingling to the right side of face, arm and leg associated with weakness . Symptoms improve after 1 hr. Pt is having severe one sided headache every day for last 1 month. Pt is non compliant with her BP medication and donot check her BP at home   Pt denies any prior episode of numbness , h/o migraine, blurry vision, urinary or bladder incontinence ,seizures, CP, SOB, cough , Abd pain , nausea, vomiting or any urinary symptoms     ED course:   Vitals : Afebrile . 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   Utox -ve     SH : Denies Smoking, alcohol or drug use (10 Mar 2020 19:19)      Interval History -        Subjective:        MEDICATIONS  (STANDING):  amLODIPine   Tablet 5 milliGRAM(s) Oral daily  aspirin  chewable 81 milliGRAM(s) Oral daily  atorvastatin 80 milliGRAM(s) Oral at bedtime  enoxaparin Injectable 40 milliGRAM(s) SubCutaneous daily  ergocalciferol 85177 Unit(s) Oral <User Schedule>  influenza   Vaccine 0.5 milliLiter(s) IntraMuscular once  verapamil 40 milliGRAM(s) Oral three times a day    MEDICATIONS  (PRN):  acetaminophen   Tablet .. 650 milliGRAM(s) Oral every 6 hours PRN Temp greater or equal to 38C (100.4F), Mild Pain (1 - 3)  ketorolac   Injectable 30 milliGRAM(s) IV Push every 6 hours PRN Severe Pain (7 - 10)  traMADol 50 milliGRAM(s) Oral every 8 hours PRN Moderate Pain (4 - 6)      Allergies    No Known Allergies    Intolerances        Review of Systems:  General: [ ] None, [ ] chills, [ ]fatigue, [ ] fevers  Skin: [ ] None, [ ] rash   HEENT: [ ] None, [ ] head injury, [ ] blurred vision, [ ] double vision, [ ] eye pain, [ ] visual loss, [ ] hearing loss, [ ] deafness, [ ] ear pain, [ ] ringing in the ears, [ ] vertigo, [ ] sinus pain, [ ] voice changes  Neck: [ ] None, [ ] neck stiffness  Respiratory: [ ] None, [ ] cough, [ ] difficulty breathing  Cardiovascular: [ ] None, [ ] calf cramps, [ ] chest pain, [ ] leg pain, [ ] swelling, [ ] rapid heart rate, [ ] shortness of breath  Gastrointestinal: [ ] None, [ ] abdominal pain, [ ] nausea, [ ] vomiting  Musculoskeletal: [ ] None, [ ] back pain, [ ] joint pain, [ ] joint stiffness, [ ] leg cramps, [ ] muscle atrophy, [ ] muscle cramps, [ ] muscle weakness, [ ] swelling of extremities  Neurological: [ ] None, [ ] Dizziness, [ ] decreased memory, [ ] fainting, [ ] focal neurological symptoms, [ ] headaches, [ ] incontinence of stool, [ ] incontinence of urine, [ ] loss of consciousness, [ ] numbness, [ ] seizures, [ ] spinning sensation, [ ] stroke, [ ] trouble walking, [ ] unsteadiness, [ ] visual changes, [ ] weakness  Psychiatric: [ ] None,  [ ] depression, [ ] anxiety, [ ] hallucinations, [ ] inability to concentrate, [ ] mood changes, [ ] panic attacks  Hematology: [ ] None,  [ ] blood clots, [ ] spontaneous bleeding      Objective:   Vital Signs Last 24 Hrs  T(C): 37.3 (11 Mar 2020 20:08), Max: 37.7 (11 Mar 2020 02:13)  T(F): 99.2 (11 Mar 2020 20:08), Max: 99.9 (11 Mar 2020 02:13)  HR: 92 (11 Mar 2020 23:15) (82 - 102)  BP: 146/- (11 Mar 2020 23:15) (125/83 - 179/92)  BP(mean): 90 (11 Mar 2020 23:15) (90 - 90)  RR: 20 (11 Mar 2020 23:15) (18 - 20)  SpO2: 100% (11 Mar 2020 23:15) (96% - 100%)    General Exam:   General appearance: No acute distress                 Cardiovascular: Pedal dorsalis pulses intact bilaterally    Neurological Exam:  Mental Status: Orientated to self, date and place.  Attention intact.  No dysarthria, aphasia or neglect.  Knowledge intact.  Registration intact.  Short and long term memory grossly intact.      Cranial Nerves: CN I - not tested.  PERRL, EOMI, VFF, no nystagmus or diplopia.  No APD.  Fundi not visualized bilaterally.  CN V1-3 intact to light touch and pinprick.  No facial asymmetry.  Hearing intact to finger rub bilaterally.  Tongue, uvula and palate midline.  Sternocleidomastoid and Trapezius intact bilaterally.    Motor:   Tone: normal.                  Strength: intact throughout  Pronator drift: none                 Dysmeria: None to finger-nose-finger or heel-shin-heel  No truncal ataxia.    Tremor: No resting, postural or action tremor.  No myoclonus.    Sensation: intact to light touch, pinprick, vibration and proprioception    Deep Tendon Reflexes: 1+ bilateral biceps, triceps, brachioradialis, knee and ankle  Toes flexor bilaterally    Gait: normal and stable.      Other:    03-11    136  |  107  |  9   ----------------------------<  100<H>  3.3<L>   |  22  |  0.82    Ca    8.4      11 Mar 2020 08:20  Phos  2.3     03-11  Mg     2.3     03-11    TPro  7.3  /  Alb  3.4<L>  /  TBili  0.5  /  DBili  x   /  AST  14  /  ALT  18  /  AlkPhos  87  03-10          Radiology    EKG:  tele:  TTE:  EEG:                 Please contact the Neurology consult service with any questions.    Yg Cyr MD   of Neurology  [facility] at [facility] Neurology Follow up note    Name  GEORGIANA RAMIREZ    HPI:  49 y F from home walks independently with Mhx of HTN (noncompliant with meds), Sickle cell trait( never received any transfusion) and PSHx of heart surgery?( for some clot) presented to ED with numbness, tingling and weakness of the Right side since yesterday. As per pt she was last seen normal 7am yesterday. Pt started having numbness of the Right leg yesterday  which resolved after some time. Today pt woke up around 7 am with dizziness so went back to sleep. Pt then woke up at 10:30 am and noticed Numbness and tingling to the right side of face, arm and leg associated with weakness. Symptoms improve after 1 hr. Pt is having severe one sided headache every day for last 1 month. Pt is noncompliant with her BP medication and did not check her BP at home   Pt denies any prior episode of numbness, h/o migraine, blurry vision, urinary or bladder incontinence, seizures, CP, SOB, cough, Abd pain, nausea, vomiting or any urinary symptoms.  ED course:   Vitals : Afebrile . 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   Utox -ve     NEURO HPI:  49 LHF presented with recurrent left-sided headache and right upper and lower extremity weakness and numbness, last seen normal yesterday before sleep (approximately 22:00 on 3/9/20).    Interval History -  The patient's headaches have improved. His right-sided weakness worsened this morning, but it has improved as of this evening. He still has difficulty walking due to right-sided weakness    MEDICATIONS  (STANDING):  amLODIPine   Tablet 5 milliGRAM(s) Oral daily  aspirin  chewable 81 milliGRAM(s) Oral daily  atorvastatin 80 milliGRAM(s) Oral at bedtime  enoxaparin Injectable 40 milliGRAM(s) SubCutaneous daily  ergocalciferol 58639 Unit(s) Oral <User Schedule>  influenza   Vaccine 0.5 milliLiter(s) IntraMuscular once  verapamil 40 milliGRAM(s) Oral three times a day    MEDICATIONS  (PRN):  acetaminophen   Tablet .. 650 milliGRAM(s) Oral every 6 hours PRN Temp greater or equal to 38C (100.4F), Mild Pain (1 - 3)  ketorolac   Injectable 30 milliGRAM(s) IV Push every 6 hours PRN Severe Pain (7 - 10)  traMADol 50 milliGRAM(s) Oral every 8 hours PRN Moderate Pain (4 - 6)    Allergies  No Known Allergies    Review of Systems: Fourteen systems reviewed and negative except as in HPI / Interval History.      Objective:   Vital Signs Last 24 Hrs  T(C): 37.3 (11 Mar 2020 20:08), Max: 37.7 (11 Mar 2020 02:13)  T(F): 99.2 (11 Mar 2020 20:08), Max: 99.9 (11 Mar 2020 02:13)  HR: 92 (11 Mar 2020 23:15) (82 - 102)  BP: 146/- (11 Mar 2020 23:15) (125/83 - 179/92)  BP(mean): 90 (11 Mar 2020 23:15) (90 - 90)  RR: 20 (11 Mar 2020 23:15) (18 - 20)  SpO2: 100% (11 Mar 2020 23:15) (96% - 100%)    General Examination:  General: No acute distress  HEENT: Atraumatic, Normocephalic  Respiratory: Rapid rate, CTA B/l.  No crackles, rhonchi, or wheezes.  Cardiovascular: RRR.  Normal S1 & S2.  Normal b/l radial and pedal pulses.    Neurological Examination:  General / Mental Status: AAO x 3.  No aphasia or dysarthria.  Naming and repetition intact.  Cranial Nerves: VFF x 4.  PERRL.  EOMI x 2, No nystagmus or diplopia.  B/l V1-V3 equal and intact to light touch and pinprick.  Symmetric facial movement and palate elevation.  B/l hearing equal to finger rub.  5/5 strength with b/l sternocleidomastoid & trapezius.  Midline tongue protrusion, with no atrophy or fasciculations.  Motor: Normal bulk & tone in all four extremities.  At least 3/5 strength throughout right upper and right lower extremities, both of which show downward drift and spasticity.  5/5 strength throughout left upper and left lower extremities, without downward drift and spasticity.  Sensory: Diminished to light touch and pinprick in right upper and right lower extremities.  Intact to light touch and pinprick in left upper and left lower extremities.  Reflex: 2+ and symmetric at b/l biceps, triceps, brachioradialis, patellae, and ankles.  Downgoing toes b/l.  Coordination: No dysmetria with b/l finger-to-nose and heel raise tests.  Gait and Romberg testing deferred due to right-sided weakness.      NIHSS Score:    LOC - 0  LOC Questions - 0  LOC Commands - 0  Gaze Preference - 0  Visual Fields - 0  Facial Palsy - 0  Motor Arm Left - 0  Motor Arm Right - 1  Motor Leg Left - 0  Motor Leg Right - 1  Limb Ataxia - 0  Sensory - 1  Language - 0  Speech - 0  Extinction - 0    NIHSS Score Total: 3    Modified Rj Scale: 2      Labs:    03-11    136  |  107  |  9   ----------------------------<  100<H>  3.3<L>   |  22  |  0.82    Ca    8.4      11 Mar 2020 08:20  Phos  2.3     03-11  Mg     2.3     03-11    TPro  7.3  /  Alb  3.4<L>  /  TBili  0.5  /  DBili  x   /  AST  14  /  ALT  18  /  AlkPhos  87  03-10    Hemoglobin A1C, Whole Blood in AM (03.11.20 @ 09:40)    Hemoglobin A1C, Whole Blood: 5.7%    Lipid Profile in AM (03.11.20 @ 08:20)    Total Cholesterol/HDL Ratio Measurement: 2.8 RATIO    Cholesterol, Serum: 140 mg/dL    Triglycerides, Serum: 46 mg/dL    HDL Cholesterol, Serum: 50 mg/dL    Direct LDL: 81 mg/dL      Neuroimaging:    CT Head (3/10/20): Chronic left PCA territory infarct    CTA Head/Neck (3/11/20):  - Subacute/Chronic left thalamic infarct  - No intracranial or neck vessel abnormalities    Echo (3/11/20):  - LVEF > 55%  - Trace tricuspid, pulmonic, and aortic regurgitation  - No cardiac thrombus or intracardiac shunt identified      Assessment:  Hypertensive headache and chronic left PCA territory infarct secondary to hypertension vs. cardioembolic source      Recommendations:    1. Stroke workup  - Q4H Neurochecks & Vital signs  - Continue Telemetry while inpatient    2. Secondary stroke prevention  - Aspirin 81mg daily  - Clopidogrel 75mg daily x 21 days  - Atorvastatin 40mg QHS  - Treat BP if over 120/80 (out of time window for permissive hypertension)       - Continue verapamil 40mg TID to treat hypertension and chronic daily headache  - PT/OT  - DVT ppx      Please contact the Neurology consult service with any questions.    Yg Cyr MD   of Neurology  [facility] at [facility] Neurology Follow up note    Name  GEORGIANA RAMIREZ    HPI:  49 y F from home walks independently with Mhx of HTN (noncompliant with meds), Sickle cell trait( never received any transfusion) and PSHx of heart surgery?( for some clot) presented to ED with numbness, tingling and weakness of the Right side since yesterday. As per pt she was last seen normal 7am yesterday. Pt started having numbness of the Right leg yesterday  which resolved after some time. Today pt woke up around 7 am with dizziness so went back to sleep. Pt then woke up at 10:30 am and noticed Numbness and tingling to the right side of face, arm and leg associated with weakness. Symptoms improve after 1 hr. Pt is having severe one sided headache every day for last 1 month. Pt is noncompliant with her BP medication and did not check her BP at home   Pt denies any prior episode of numbness, h/o migraine, blurry vision, urinary or bladder incontinence, seizures, CP, SOB, cough, Abd pain, nausea, vomiting or any urinary symptoms.  ED course:   Vitals : Afebrile . 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   Utox -ve     NEURO HPI:  49 LHF presented with recurrent left-sided headache and right upper and lower extremity weakness and numbness, last seen normal yesterday before sleep (approximately 22:00 on 3/9/20).    Interval History -  The patient's headaches have improved. His right-sided weakness worsened this morning, but it has improved as of this evening. He still has difficulty walking due to right-sided weakness    MEDICATIONS  (STANDING):  amLODIPine   Tablet 5 milliGRAM(s) Oral daily  aspirin  chewable 81 milliGRAM(s) Oral daily  atorvastatin 80 milliGRAM(s) Oral at bedtime  enoxaparin Injectable 40 milliGRAM(s) SubCutaneous daily  ergocalciferol 83399 Unit(s) Oral <User Schedule>  influenza   Vaccine 0.5 milliLiter(s) IntraMuscular once  verapamil 40 milliGRAM(s) Oral three times a day    MEDICATIONS  (PRN):  acetaminophen   Tablet .. 650 milliGRAM(s) Oral every 6 hours PRN Temp greater or equal to 38C (100.4F), Mild Pain (1 - 3)  ketorolac   Injectable 30 milliGRAM(s) IV Push every 6 hours PRN Severe Pain (7 - 10)  traMADol 50 milliGRAM(s) Oral every 8 hours PRN Moderate Pain (4 - 6)    Allergies  No Known Allergies    Review of Systems: Fourteen systems reviewed and negative except as in HPI / Interval History.      Objective:   Vital Signs Last 24 Hrs  T(C): 37.3 (11 Mar 2020 20:08), Max: 37.7 (11 Mar 2020 02:13)  T(F): 99.2 (11 Mar 2020 20:08), Max: 99.9 (11 Mar 2020 02:13)  HR: 92 (11 Mar 2020 23:15) (82 - 102)  BP: 146/- (11 Mar 2020 23:15) (125/83 - 179/92)  BP(mean): 90 (11 Mar 2020 23:15) (90 - 90)  RR: 20 (11 Mar 2020 23:15) (18 - 20)  SpO2: 100% (11 Mar 2020 23:15) (96% - 100%)    General Examination:  General: No acute distress  HEENT: Atraumatic, Normocephalic  Respiratory: Rapid rate, CTA B/l.  No crackles, rhonchi, or wheezes.  Cardiovascular: RRR.  Normal S1 & S2.  Normal b/l radial and pedal pulses.    Neurological Examination:  General / Mental Status: AAO x 3.  No aphasia or dysarthria.  Naming and repetition intact.  Cranial Nerves: VFF x 4.  PERRL.  EOMI x 2, No nystagmus or diplopia.  B/l V1-V3 equal and intact to light touch and pinprick.  Symmetric facial movement and palate elevation.  B/l hearing equal to finger rub.  5/5 strength with b/l sternocleidomastoid & trapezius.  Midline tongue protrusion, with no atrophy or fasciculations.  Motor: Normal bulk & tone in all four extremities.  At least 3/5 strength throughout right upper and right lower extremities, both of which show downward drift and spasticity.  5/5 strength throughout left upper and left lower extremities, without downward drift and spasticity.  Sensory: Diminished to light touch and pinprick in right upper and right lower extremities.  Intact to light touch and pinprick in left upper and left lower extremities.  Reflex: 2+ and symmetric at b/l biceps, triceps, brachioradialis, patellae, and ankles.  Downgoing toes b/l.  Coordination: No dysmetria with b/l finger-to-nose and heel raise tests.  Gait and Romberg testing deferred due to right-sided weakness.      NIHSS Score:    LOC - 0  LOC Questions - 0  LOC Commands - 0  Gaze Preference - 0  Visual Fields - 0  Facial Palsy - 0  Motor Arm Left - 0  Motor Arm Right - 1  Motor Leg Left - 0  Motor Leg Right - 1  Limb Ataxia - 0  Sensory - 1  Language - 0  Speech - 0  Extinction - 0    NIHSS Score Total: 3    Modified Rj Scale: 2      Labs:    03-11    136  |  107  |  9   ----------------------------<  100<H>  3.3<L>   |  22  |  0.82    Ca    8.4      11 Mar 2020 08:20  Phos  2.3     03-11  Mg     2.3     03-11    TPro  7.3  /  Alb  3.4<L>  /  TBili  0.5  /  DBili  x   /  AST  14  /  ALT  18  /  AlkPhos  87  03-10    Hemoglobin A1C, Whole Blood in AM (03.11.20 @ 09:40)    Hemoglobin A1C, Whole Blood: 5.7%    Lipid Profile in AM (03.11.20 @ 08:20)    Total Cholesterol/HDL Ratio Measurement: 2.8 RATIO    Cholesterol, Serum: 140 mg/dL    Triglycerides, Serum: 46 mg/dL    HDL Cholesterol, Serum: 50 mg/dL    Direct LDL: 81 mg/dL      Neuroimaging:    CT Head (3/10/20): Chronic left PCA territory infarct    CTA Head/Neck (3/11/20):  - Subacute/Chronic left thalamic infarct  - No intracranial or neck vessel abnormalities    Echo (3/11/20):  - LVEF > 55%  - Trace tricuspid, pulmonic, and aortic regurgitation  - No cardiac thrombus or intracardiac shunt identified      Assessment:  49 LHF with hypertensive headache and chronic left PCA territory infarct secondary to hypertension vs. cardioembolic source      Recommendations:    1. Stroke workup  - Q4H Neurochecks & Vital signs  - Continue Telemetry while inpatient    2. Secondary stroke prevention  - Aspirin 81mg daily  - Clopidogrel 75mg daily x 21 days  - Atorvastatin 40mg QHS  - Treat BP if over 120/80 (out of time window for permissive hypertension)       - Continue verapamil 40mg TID to treat hypertension and chronic daily headache  - PT/OT  - DVT ppx      Please contact the Neurology consult service with any questions.    Yg Cyr MD   of Neurology  [facility] at [facility] Neurology Follow up note    Name  GEORGIANA RAMIREZ    HPI:  49 y F from home walks independently with Mhx of HTN (noncompliant with meds), Sickle cell trait( never received any transfusion) and PSHx of heart surgery?( for some clot) presented to ED with numbness, tingling and weakness of the Right side since yesterday. As per pt she was last seen normal 7am yesterday. Pt started having numbness of the Right leg yesterday  which resolved after some time. Today pt woke up around 7 am with dizziness so went back to sleep. Pt then woke up at 10:30 am and noticed Numbness and tingling to the right side of face, arm and leg associated with weakness. Symptoms improve after 1 hr. Pt is having severe one sided headache every day for last 1 month. Pt is noncompliant with her BP medication and did not check her BP at home   Pt denies any prior episode of numbness, h/o migraine, blurry vision, urinary or bladder incontinence, seizures, CP, SOB, cough, Abd pain, nausea, vomiting or any urinary symptoms.  ED course:   Vitals : Afebrile . 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   Utox -ve     NEURO HPI:  49 LHF presented with recurrent left-sided headache and right upper and lower extremity weakness and numbness, last seen normal yesterday before sleep (approximately 22:00 on 3/9/20).    Interval History -  The patient's headaches have improved. His right-sided weakness worsened this morning, but it has improved as of this evening. He still has difficulty walking due to right-sided weakness.    MEDICATIONS  (STANDING):  amLODIPine   Tablet 5 milliGRAM(s) Oral daily  aspirin  chewable 81 milliGRAM(s) Oral daily  atorvastatin 80 milliGRAM(s) Oral at bedtime  enoxaparin Injectable 40 milliGRAM(s) SubCutaneous daily  ergocalciferol 66653 Unit(s) Oral <User Schedule>  influenza   Vaccine 0.5 milliLiter(s) IntraMuscular once  verapamil 40 milliGRAM(s) Oral three times a day    MEDICATIONS  (PRN):  acetaminophen   Tablet .. 650 milliGRAM(s) Oral every 6 hours PRN Temp greater or equal to 38C (100.4F), Mild Pain (1 - 3)  ketorolac   Injectable 30 milliGRAM(s) IV Push every 6 hours PRN Severe Pain (7 - 10)  traMADol 50 milliGRAM(s) Oral every 8 hours PRN Moderate Pain (4 - 6)    Allergies  No Known Allergies    Review of Systems: Fourteen systems reviewed and negative except as in HPI / Interval History.      Objective:   Vital Signs Last 24 Hrs  T(C): 37.3 (11 Mar 2020 20:08), Max: 37.7 (11 Mar 2020 02:13)  T(F): 99.2 (11 Mar 2020 20:08), Max: 99.9 (11 Mar 2020 02:13)  HR: 92 (11 Mar 2020 23:15) (82 - 102)  BP: 146/- (11 Mar 2020 23:15) (125/83 - 179/92)  BP(mean): 90 (11 Mar 2020 23:15) (90 - 90)  RR: 20 (11 Mar 2020 23:15) (18 - 20)  SpO2: 100% (11 Mar 2020 23:15) (96% - 100%)    General Examination:  General: No acute distress  HEENT: Atraumatic, Normocephalic  Respiratory: Rapid rate, CTA B/l.  No crackles, rhonchi, or wheezes.  Cardiovascular: RRR.  Normal S1 & S2.  Normal b/l radial and pedal pulses.    Neurological Examination:  General / Mental Status: AAO x 3.  No aphasia or dysarthria.  Naming and repetition intact.  Cranial Nerves: VFF x 4.  PERRL.  EOMI x 2, No nystagmus or diplopia.  B/l V1-V3 equal and intact to light touch and pinprick.  Symmetric facial movement and palate elevation.  B/l hearing equal to finger rub.  5/5 strength with b/l sternocleidomastoid & trapezius.  Midline tongue protrusion, with no atrophy or fasciculations.  Motor: Normal bulk & tone in all four extremities.  At least 3/5 strength throughout right upper and right lower extremities, both of which show downward drift and spasticity.  5/5 strength throughout left upper and left lower extremities, without downward drift and spasticity.  Sensory: Diminished to light touch and pinprick in right upper and right lower extremities.  Intact to light touch and pinprick in left upper and left lower extremities.  Reflex: 2+ and symmetric at b/l biceps, triceps, brachioradialis, patellae, and ankles.  Downgoing toes b/l.  Coordination: No dysmetria with b/l finger-to-nose and heel raise tests.  Gait and Romberg testing deferred due to right-sided weakness.      NIHSS Score:    LOC - 0  LOC Questions - 0  LOC Commands - 0  Gaze Preference - 0  Visual Fields - 0  Facial Palsy - 0  Motor Arm Left - 0  Motor Arm Right - 1  Motor Leg Left - 0  Motor Leg Right - 1  Limb Ataxia - 0  Sensory - 1  Language - 0  Speech - 0  Extinction - 0    NIHSS Score Total: 3    Modified Bledsoe Scale: 2      Labs:    03-11    136  |  107  |  9   ----------------------------<  100<H>  3.3<L>   |  22  |  0.82    Ca    8.4      11 Mar 2020 08:20  Phos  2.3     03-11  Mg     2.3     03-11    TPro  7.3  /  Alb  3.4<L>  /  TBili  0.5  /  DBili  x   /  AST  14  /  ALT  18  /  AlkPhos  87  03-10    Hemoglobin A1C, Whole Blood in AM (03.11.20 @ 09:40)    Hemoglobin A1C, Whole Blood: 5.7%    Lipid Profile in AM (03.11.20 @ 08:20)    Total Cholesterol/HDL Ratio Measurement: 2.8 RATIO    Cholesterol, Serum: 140 mg/dL    Triglycerides, Serum: 46 mg/dL    HDL Cholesterol, Serum: 50 mg/dL    Direct LDL: 81 mg/dL      Neuroimaging:    CT Head (3/10/20): Chronic left PCA territory infarct    CTA Head/Neck (3/11/20):  - Subacute/Chronic left thalamic infarct  - No intracranial or neck vessel abnormalities    Echo (3/11/20):  - LVEF > 55%  - Trace tricuspid, pulmonic, and aortic regurgitation  - No cardiac thrombus or intracardiac shunt identified      Assessment:  49 LHF with hypertensive headache and chronic left PCA territory infarct secondary to hypertension vs. cardioembolic source      Recommendations:    1. Stroke workup  - Q4H Neurochecks & Vital signs  - Continue Telemetry while inpatient    2. Secondary stroke prevention  - Aspirin 81mg daily  - Clopidogrel 75mg daily x 21 days  - Atorvastatin 40mg QHS  - Treat BP if over 120/80 (out of time window for permissive hypertension)       - Continue verapamil 40mg TID to treat hypertension and chronic daily headache  - PT/OT  - DVT ppx      Please contact the Neurology consult service with any questions.    Yg Cyr MD   of Neurology  [facility] at [facility]

## 2020-03-11 NOTE — PROGRESS NOTE ADULT - PROBLEM SELECTOR PLAN 2
Pt non compliant with her med  - Goal BP around 150   -s/p hydralazine 10 mg X1   -started on amlodipine 5 mg   -monitor BP , can give IV pushes if BP elevated Pt non compliant with her med  -started on amlodipine 5 mg   -monitor BP   -will started on verapamil 40mg tid as per neurology note after 24 hr window

## 2020-03-12 LAB
ALBUMIN SERPL ELPH-MCNC: 3.3 G/DL — LOW (ref 3.5–5)
ALP SERPL-CCNC: 85 U/L — SIGNIFICANT CHANGE UP (ref 40–120)
ALT FLD-CCNC: 23 U/L DA — SIGNIFICANT CHANGE UP (ref 10–60)
ANION GAP SERPL CALC-SCNC: 7 MMOL/L — SIGNIFICANT CHANGE UP (ref 5–17)
AST SERPL-CCNC: 19 U/L — SIGNIFICANT CHANGE UP (ref 10–40)
BASOPHILS # BLD AUTO: 0.02 K/UL — SIGNIFICANT CHANGE UP (ref 0–0.2)
BASOPHILS NFR BLD AUTO: 0.2 % — SIGNIFICANT CHANGE UP (ref 0–2)
BILIRUB SERPL-MCNC: 0.5 MG/DL — SIGNIFICANT CHANGE UP (ref 0.2–1.2)
BUN SERPL-MCNC: 9 MG/DL — SIGNIFICANT CHANGE UP (ref 7–18)
CALCIUM SERPL-MCNC: 8.1 MG/DL — LOW (ref 8.4–10.5)
CHLORIDE SERPL-SCNC: 106 MMOL/L — SIGNIFICANT CHANGE UP (ref 96–108)
CO2 SERPL-SCNC: 22 MMOL/L — SIGNIFICANT CHANGE UP (ref 22–31)
CREAT SERPL-MCNC: 0.87 MG/DL — SIGNIFICANT CHANGE UP (ref 0.5–1.3)
EOSINOPHIL # BLD AUTO: 0.21 K/UL — SIGNIFICANT CHANGE UP (ref 0–0.5)
EOSINOPHIL NFR BLD AUTO: 2.5 % — SIGNIFICANT CHANGE UP (ref 0–6)
GLUCOSE SERPL-MCNC: 87 MG/DL — SIGNIFICANT CHANGE UP (ref 70–99)
HCT VFR BLD CALC: 36.8 % — SIGNIFICANT CHANGE UP (ref 34.5–45)
HGB BLD-MCNC: 12 G/DL — SIGNIFICANT CHANGE UP (ref 11.5–15.5)
IMM GRANULOCYTES NFR BLD AUTO: 0.2 % — SIGNIFICANT CHANGE UP (ref 0–1.5)
LYMPHOCYTES # BLD AUTO: 1.1 K/UL — SIGNIFICANT CHANGE UP (ref 1–3.3)
LYMPHOCYTES # BLD AUTO: 13 % — SIGNIFICANT CHANGE UP (ref 13–44)
MAGNESIUM SERPL-MCNC: 2 MG/DL — SIGNIFICANT CHANGE UP (ref 1.6–2.6)
MCHC RBC-ENTMCNC: 21.9 PG — LOW (ref 27–34)
MCHC RBC-ENTMCNC: 32.6 GM/DL — SIGNIFICANT CHANGE UP (ref 32–36)
MCV RBC AUTO: 67.3 FL — LOW (ref 80–100)
MONOCYTES # BLD AUTO: 0.85 K/UL — SIGNIFICANT CHANGE UP (ref 0–0.9)
MONOCYTES NFR BLD AUTO: 10.1 % — SIGNIFICANT CHANGE UP (ref 2–14)
NEUTROPHILS # BLD AUTO: 6.23 K/UL — SIGNIFICANT CHANGE UP (ref 1.8–7.4)
NEUTROPHILS NFR BLD AUTO: 74 % — SIGNIFICANT CHANGE UP (ref 43–77)
NRBC # BLD: 0 /100 WBCS — SIGNIFICANT CHANGE UP (ref 0–0)
PHOSPHATE SERPL-MCNC: 2.3 MG/DL — LOW (ref 2.5–4.5)
PLATELET # BLD AUTO: 348 K/UL — SIGNIFICANT CHANGE UP (ref 150–400)
POTASSIUM SERPL-MCNC: 3.5 MMOL/L — SIGNIFICANT CHANGE UP (ref 3.5–5.3)
POTASSIUM SERPL-SCNC: 3.5 MMOL/L — SIGNIFICANT CHANGE UP (ref 3.5–5.3)
PROT SERPL-MCNC: 7.4 G/DL — SIGNIFICANT CHANGE UP (ref 6–8.3)
RBC # BLD: 5.47 M/UL — HIGH (ref 3.8–5.2)
RBC # FLD: 17.3 % — HIGH (ref 10.3–14.5)
SODIUM SERPL-SCNC: 135 MMOL/L — SIGNIFICANT CHANGE UP (ref 135–145)
WBC # BLD: 8.43 K/UL — SIGNIFICANT CHANGE UP (ref 3.8–10.5)
WBC # FLD AUTO: 8.43 K/UL — SIGNIFICANT CHANGE UP (ref 3.8–10.5)

## 2020-03-12 PROCEDURE — 99233 SBSQ HOSP IP/OBS HIGH 50: CPT

## 2020-03-12 RX ORDER — VERAPAMIL HCL 240 MG
80 CAPSULE, EXTENDED RELEASE PELLETS 24 HR ORAL EVERY 8 HOURS
Refills: 0 | Status: DISCONTINUED | OUTPATIENT
Start: 2020-03-12 | End: 2020-03-18

## 2020-03-12 RX ORDER — POTASSIUM PHOSPHATE, MONOBASIC POTASSIUM PHOSPHATE, DIBASIC 236; 224 MG/ML; MG/ML
30 INJECTION, SOLUTION INTRAVENOUS ONCE
Refills: 0 | Status: COMPLETED | OUTPATIENT
Start: 2020-03-12 | End: 2020-03-12

## 2020-03-12 RX ORDER — CLOPIDOGREL BISULFATE 75 MG/1
75 TABLET, FILM COATED ORAL DAILY
Refills: 0 | Status: DISCONTINUED | OUTPATIENT
Start: 2020-03-12 | End: 2020-03-18

## 2020-03-12 RX ORDER — SODIUM CHLORIDE 9 MG/ML
1000 INJECTION, SOLUTION INTRAVENOUS
Refills: 0 | Status: DISCONTINUED | OUTPATIENT
Start: 2020-03-12 | End: 2020-03-12

## 2020-03-12 RX ADMIN — AMLODIPINE BESYLATE 5 MILLIGRAM(S): 2.5 TABLET ORAL at 05:51

## 2020-03-12 RX ADMIN — ATORVASTATIN CALCIUM 80 MILLIGRAM(S): 80 TABLET, FILM COATED ORAL at 23:01

## 2020-03-12 RX ADMIN — Medication 81 MILLIGRAM(S): at 13:20

## 2020-03-12 RX ADMIN — CLOPIDOGREL BISULFATE 75 MILLIGRAM(S): 75 TABLET, FILM COATED ORAL at 13:20

## 2020-03-12 RX ADMIN — POTASSIUM PHOSPHATE, MONOBASIC POTASSIUM PHOSPHATE, DIBASIC 83.33 MILLIMOLE(S): 236; 224 INJECTION, SOLUTION INTRAVENOUS at 10:28

## 2020-03-12 RX ADMIN — Medication 40 MILLIGRAM(S): at 05:51

## 2020-03-12 RX ADMIN — Medication 80 MILLIGRAM(S): at 14:53

## 2020-03-12 RX ADMIN — Medication 80 MILLIGRAM(S): at 23:01

## 2020-03-12 RX ADMIN — ENOXAPARIN SODIUM 40 MILLIGRAM(S): 100 INJECTION SUBCUTANEOUS at 13:20

## 2020-03-12 RX ADMIN — SODIUM CHLORIDE 70 MILLILITER(S): 9 INJECTION, SOLUTION INTRAVENOUS at 09:59

## 2020-03-12 NOTE — PROGRESS NOTE ADULT - SUBJECTIVE AND OBJECTIVE BOX
Patient still has right-sided weakness and sensory deficit.  Swallowing function has improved - can now tolerate pureed diet.  Patient has walked with the assistance of physical therapy.    Recs:  - Continue secondary stroke prevention  - Treat BP > 120/80  - Continue PT/OT with plan for inpatient rehabilitation upon discharge  - Routine follow-up in stroke clinic      NOTE TO BE COMPLETED - PLEASE REFER TO ABOVE ONLY AND IGNORE INFORMATION BELOW    Neurology Follow up note    Name  GEORGIANA RAMIREZ    HPI:  49 y F from home walks independently with Mhx of HTN ( non compliant with meds), Sickle cell trait( never received any transfusion) and PSHx of heart surgery?( for some clot) presented to ED with Numbness, tingling and weakness of the Right side since yesterday. As per pt she was last seen normal 7am yesterday. Pt started having Numbness of the Right leg yesterday  which resolved after some time. Today pt woke up around 7 am with dizziness so went back to sleep. Pt then woke up at 10:30 am and noticed Numbness and tingling to the right side of face, arm and leg associated with weakness . Symptoms improve after 1 hr. Pt is having severe one sided headache every day for last 1 month. Pt is non compliant with her BP medication and donot check her BP at home   Pt denies any prior episode of numbness , h/o migraine, blurry vision, urinary or bladder incontinence ,seizures, CP, SOB, cough , Abd pain , nausea, vomiting or any urinary symptoms     ED course:   Vitals : Afebrile . 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   Utox -ve     SH : Denies Smoking, alcohol or drug use (10 Mar 2020 19:19)      Interval History -        Subjective:        MEDICATIONS  (STANDING):  aspirin  chewable 81 milliGRAM(s) Oral daily  atorvastatin 80 milliGRAM(s) Oral at bedtime  clopidogrel Tablet 75 milliGRAM(s) Oral daily  dextrose 5% + sodium chloride 0.9%. 1000 milliLiter(s) (70 mL/Hr) IV Continuous <Continuous>  enoxaparin Injectable 40 milliGRAM(s) SubCutaneous daily  ergocalciferol 69178 Unit(s) Oral <User Schedule>  influenza   Vaccine 0.5 milliLiter(s) IntraMuscular once  verapamil 80 milliGRAM(s) Oral every 8 hours    MEDICATIONS  (PRN):  acetaminophen   Tablet .. 650 milliGRAM(s) Oral every 6 hours PRN Temp greater or equal to 38C (100.4F), Mild Pain (1 - 3)  ketorolac   Injectable 30 milliGRAM(s) IV Push every 6 hours PRN Severe Pain (7 - 10)  traMADol 50 milliGRAM(s) Oral every 8 hours PRN Moderate Pain (4 - 6)      Allergies    No Known Allergies    Intolerances        Review of Systems:  General: [ ] None, [ ] chills, [ ]fatigue, [ ] fevers  Skin: [ ] None, [ ] rash   HEENT: [ ] None, [ ] head injury, [ ] blurred vision, [ ] double vision, [ ] eye pain, [ ] visual loss, [ ] hearing loss, [ ] deafness, [ ] ear pain, [ ] ringing in the ears, [ ] vertigo, [ ] sinus pain, [ ] voice changes  Neck: [ ] None, [ ] neck stiffness  Respiratory: [ ] None, [ ] cough, [ ] difficulty breathing  Cardiovascular: [ ] None, [ ] calf cramps, [ ] chest pain, [ ] leg pain, [ ] swelling, [ ] rapid heart rate, [ ] shortness of breath  Gastrointestinal: [ ] None, [ ] abdominal pain, [ ] nausea, [ ] vomiting  Musculoskeletal: [ ] None, [ ] back pain, [ ] joint pain, [ ] joint stiffness, [ ] leg cramps, [ ] muscle atrophy, [ ] muscle cramps, [ ] muscle weakness, [ ] swelling of extremities  Neurological: [ ] None, [ ] Dizziness, [ ] decreased memory, [ ] fainting, [ ] focal neurological symptoms, [ ] headaches, [ ] incontinence of stool, [ ] incontinence of urine, [ ] loss of consciousness, [ ] numbness, [ ] seizures, [ ] spinning sensation, [ ] stroke, [ ] trouble walking, [ ] unsteadiness, [ ] visual changes, [ ] weakness  Psychiatric: [ ] None,  [ ] depression, [ ] anxiety, [ ] hallucinations, [ ] inability to concentrate, [ ] mood changes, [ ] panic attacks  Hematology: [ ] None,  [ ] blood clots, [ ] spontaneous bleeding      Objective:   Vital Signs Last 24 Hrs  T(C): 36.8 (12 Mar 2020 16:19), Max: 37.3 (11 Mar 2020 20:08)  T(F): 98.2 (12 Mar 2020 16:19), Max: 99.2 (11 Mar 2020 20:08)  HR: 93 (12 Mar 2020 16:19) (82 - 99)  BP: 114/76 (12 Mar 2020 16:19) (114/76 - 173/102)  BP(mean): --  RR: 16 (12 Mar 2020 16:19) (16 - 20)  SpO2: 99% (12 Mar 2020 16:19) (98% - 100%)    General Exam:   General appearance: No acute distress                 Cardiovascular: Pedal dorsalis pulses intact bilaterally    Neurological Exam:  Mental Status: Orientated to self, date and place.  Attention intact.  No dysarthria, aphasia or neglect.  Knowledge intact.  Registration intact.  Short and long term memory grossly intact.      Cranial Nerves: CN I - not tested.  PERRL, EOMI, VFF, no nystagmus or diplopia.  No APD.  Fundi not visualized bilaterally.  CN V1-3 intact to light touch and pinprick.  No facial asymmetry.  Hearing intact to finger rub bilaterally.  Tongue, uvula and palate midline.  Sternocleidomastoid and Trapezius intact bilaterally.    Motor:   Tone: normal.                  Strength: intact throughout  Pronator drift: none                 Dysmeria: None to finger-nose-finger or heel-shin-heel  No truncal ataxia.    Tremor: No resting, postural or action tremor.  No myoclonus.    Sensation: intact to light touch, pinprick, vibration and proprioception    Deep Tendon Reflexes: 1+ bilateral biceps, triceps, brachioradialis, knee and ankle  Toes flexor bilaterally    Gait: normal and stable.      Other:    03-12    135  |  106  |  9   ----------------------------<  87  3.5   |  22  |  0.87    Ca    8.1<L>      12 Mar 2020 07:05  Phos  2.3     03-12  Mg     2.0     03-12    TPro  7.4  /  Alb  3.3<L>  /  TBili  0.5  /  DBili  x   /  AST  19  /  ALT  23  /  AlkPhos  85  03-12        Radiology    EKG:  tele:  TTE:  EEG:                 Please contact the Neurology consult service with any questions.    Yg Cyr MD   of Neurology  [facility] at [facility] Neurology Follow up note    Name  GEORGIANA RAMIREZ    HPI:  49 y F from home walks independently with Mhx of HTN (noncompliant with meds), Sickle cell trait( never received any transfusion) and PSHx of heart surgery?( for some clot) presented to ED with numbness, tingling and weakness of the Right side since yesterday. As per pt she was last seen normal 7am yesterday. Pt started having numbness of the Right leg yesterday  which resolved after some time. Today pt woke up around 7 am with dizziness so went back to sleep. Pt then woke up at 10:30 am and noticed Numbness and tingling to the right side of face, arm and leg associated with weakness. Symptoms improve after 1 hr. Pt is having severe one sided headache every day for last 1 month. Pt is noncompliant with her BP medication and did not check her BP at home   Pt denies any prior episode of numbness, h/o migraine, blurry vision, urinary or bladder incontinence, seizures, CP, SOB, cough, Abd pain, nausea, vomiting or any urinary symptoms.  ED course:   Vitals : Afebrile . 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   Utox -ve     NEURO HPI:  49 LHF presented with recurrent left-sided headache and right upper and lower extremity weakness and numbness, last seen normal yesterday before sleep (approximately 22:00 on 3/9/20).    Interval History -  The patient's headaches have improved. His right-sided weakness worsened this morning, but it has improved as of this evening. He still has difficulty walking due to right-sided weakness    The patient still has right-sided weakness and sensory deficit, but he has been able to walk with the assistance of physical therapy. His swallowing function has improved, and he can now tolerate pureed diet.    MEDICATIONS  (STANDING):  aspirin  chewable 81 milliGRAM(s) Oral daily  atorvastatin 80 milliGRAM(s) Oral at bedtime  clopidogrel Tablet 75 milliGRAM(s) Oral daily  dextrose 5% + sodium chloride 0.9%. 1000 milliLiter(s) (70 mL/Hr) IV Continuous <Continuous>  enoxaparin Injectable 40 milliGRAM(s) SubCutaneous daily  ergocalciferol 05204 Unit(s) Oral <User Schedule>  influenza   Vaccine 0.5 milliLiter(s) IntraMuscular once  verapamil 80 milliGRAM(s) Oral every 8 hours    MEDICATIONS  (PRN):  acetaminophen   Tablet .. 650 milliGRAM(s) Oral every 6 hours PRN Temp greater or equal to 38C (100.4F), Mild Pain (1 - 3)  ketorolac   Injectable 30 milliGRAM(s) IV Push every 6 hours PRN Severe Pain (7 - 10)  traMADol 50 milliGRAM(s) Oral every 8 hours PRN Moderate Pain (4 - 6)    Allergies  No Known Allergies    Review of Systems: Fourteen systems reviewed and negative except as in HPI / Interval History.      Objective:   Vital Signs Last 24 Hrs  T(C): 36.8 (12 Mar 2020 16:19), Max: 37.3 (11 Mar 2020 20:08)  T(F): 98.2 (12 Mar 2020 16:19), Max: 99.2 (11 Mar 2020 20:08)  HR: 93 (12 Mar 2020 16:19) (82 - 99)  BP: 114/76 (12 Mar 2020 16:19) (114/76 - 173/102)  RR: 16 (12 Mar 2020 16:19) (16 - 20)  SpO2: 99% (12 Mar 2020 16:19) (98% - 100%)    General Examination:  General: No acute distress  HEENT: Atraumatic, Normocephalic  Respiratory: CTA B/l.  No crackles, rhonchi, or wheezes.  Cardiovascular: RRR.  Normal S1 & S2.  Normal b/l radial and pedal pulses.    Neurological Examination:  General / Mental Status: AAO x 3.  No aphasia or dysarthria.  Naming and repetition intact.  Cranial Nerves: VFF x 4.  PERRL.  EOMI x 2, No nystagmus.  B/l V1-V3 equal and intact to light touch and pinprick.  Symmetric facial movement and palate elevation.  B/l hearing equal to finger rub.  5/5 strength with b/l sternocleidomastoid & trapezius.  Midline tongue protrusion, with no atrophy or fasciculations.  Motor: Normal bulk & tone in all four extremities.  At least 3/5 strength throughout right upper and right lower extremities, both of which show downward drift and spasticity.  5/5 strength throughout left upper and left lower extremities, without downward drift and spasticity.  Sensory: Diminished to light touch and pinprick in right upper and right lower extremities.  Intact to light touch and pinprick in left upper and left lower extremities.  Reflex: 2+ and symmetric at b/l biceps, triceps, brachioradialis, patellae, and ankles.  Downgoing toes b/l.  Coordination: No dysmetria with b/l finger-to-nose and heel raise tests.  Gait and Romberg testing deferred due to right-sided weakness.      NIHSS Score:    LOC - 0  LOC Questions - 0  LOC Commands - 0  Gaze Preference - 0  Visual Fields - 0  Facial Palsy - 0  Motor Arm Left - 0  Motor Arm Right - 1  Motor Leg Left - 0  Motor Leg Right - 1  Limb Ataxia - 0  Sensory - 1  Language - 0  Speech - 0  Extinction - 0    NIHSS Score Total: 3    Modified [place] Scale: 2      Labs:    03-12    135  |  106  |  9   ----------------------------<  87  3.5   |  22  |  0.87    Ca    8.1<L>      12 Mar 2020 07:05  Phos  2.3     03-12  Mg     2.0     03-12    TPro  7.4  /  Alb  3.3<L>  /  TBili  0.5  /  DBili  x   /  AST  19  /  ALT  23  /  AlkPhos  85  03-12    Hemoglobin A1C, Whole Blood in AM (03.11.20 @ 09:40)    Hemoglobin A1C, Whole Blood: 5.7%    Lipid Profile in AM (03.11.20 @ 08:20)    Total Cholesterol/HDL Ratio Measurement: 2.8 RATIO    Cholesterol, Serum: 140 mg/dL    Triglycerides, Serum: 46 mg/dL    HDL Cholesterol, Serum: 50 mg/dL    Direct LDL: 81 mg/dL      Neuroimaging:    CT Head (3/10/20): Chronic left PCA territory infarct    CTA Head/Neck (3/11/20):  - Subacute/Chronic left thalamic infarct  - No intracranial or neck vessel abnormalities    Echo (3/11/20):  - LVEF > 55%  - Trace tricuspid, pulmonic, and aortic regurgitation  - No cardiac thrombus or intracardiac shunt identified      Assessment:  49 LHF with hypertensive headache and chronic left PCA territory infarct secondary to hypertension vs. cardioembolic source      Recommendations:    1. Stroke workup  - Q4H Neurochecks & Vital signs  - Continue Telemetry while inpatient    2. Secondary stroke prevention  - Aspirin 81mg daily  - Clopidogrel 75mg daily x 21 days  - Atorvastatin 40mg QHS  - Treat BP if over 120/80 (out of time window for permissive hypertension)       - Continue verapamil 80mg TID to treat hypertension and chronic daily headache  - Continue PT/OT with plan for inpatient rehabilitation upon discharge  - DVT ppx    3. Routine follow-up in stroke clinic      Please contact the Neurology consult service with any questions.    Yg Cyr MD   of Neurology  [facility] at [facility] Neurology Follow up note    Name  GEORGIANA RAMIREZ    HPI:  49 y F from home walks independently with Mhx of HTN (noncompliant with meds), Sickle cell trait( never received any transfusion) and PSHx of heart surgery?( for some clot) presented to ED with numbness, tingling and weakness of the Right side since yesterday. As per pt she was last seen normal 7am yesterday. Pt started having numbness of the Right leg yesterday  which resolved after some time. Today pt woke up around 7 am with dizziness so went back to sleep. Pt then woke up at 10:30 am and noticed Numbness and tingling to the right side of face, arm and leg associated with weakness. Symptoms improve after 1 hr. Pt is having severe one sided headache every day for last 1 month. Pt is noncompliant with her BP medication and did not check her BP at home   Pt denies any prior episode of numbness, h/o migraine, blurry vision, urinary or bladder incontinence, seizures, CP, SOB, cough, Abd pain, nausea, vomiting or any urinary symptoms.  ED course:   Vitals : Afebrile . 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   Utox -ve     NEURO HPI:  49 LHF presented with recurrent left-sided headache and right upper and lower extremity weakness and numbness, last seen normal yesterday before sleep (approximately 22:00 on 3/9/20).    Interval History -  The patient still has right-sided weakness and sensory deficit, but he has been able to walk with the assistance of physical therapy. His swallowing function has improved, and he can now tolerate pureed diet.    MEDICATIONS  (STANDING):  aspirin  chewable 81 milliGRAM(s) Oral daily  atorvastatin 80 milliGRAM(s) Oral at bedtime  clopidogrel Tablet 75 milliGRAM(s) Oral daily  dextrose 5% + sodium chloride 0.9%. 1000 milliLiter(s) (70 mL/Hr) IV Continuous <Continuous>  enoxaparin Injectable 40 milliGRAM(s) SubCutaneous daily  ergocalciferol 38910 Unit(s) Oral <User Schedule>  influenza   Vaccine 0.5 milliLiter(s) IntraMuscular once  verapamil 80 milliGRAM(s) Oral every 8 hours    MEDICATIONS  (PRN):  acetaminophen   Tablet .. 650 milliGRAM(s) Oral every 6 hours PRN Temp greater or equal to 38C (100.4F), Mild Pain (1 - 3)  ketorolac   Injectable 30 milliGRAM(s) IV Push every 6 hours PRN Severe Pain (7 - 10)  traMADol 50 milliGRAM(s) Oral every 8 hours PRN Moderate Pain (4 - 6)    Allergies  No Known Allergies    Review of Systems: Fourteen systems reviewed and negative except as in HPI / Interval History.      Objective:   Vital Signs Last 24 Hrs  T(C): 36.8 (12 Mar 2020 16:19), Max: 37.3 (11 Mar 2020 20:08)  T(F): 98.2 (12 Mar 2020 16:19), Max: 99.2 (11 Mar 2020 20:08)  HR: 93 (12 Mar 2020 16:19) (82 - 99)  BP: 114/76 (12 Mar 2020 16:19) (114/76 - 173/102)  RR: 16 (12 Mar 2020 16:19) (16 - 20)  SpO2: 99% (12 Mar 2020 16:19) (98% - 100%)    General Examination:  General: No acute distress  HEENT: Atraumatic, Normocephalic  Respiratory: CTA B/l.  No crackles, rhonchi, or wheezes.  Cardiovascular: RRR.  Normal S1 & S2.  Normal b/l radial and pedal pulses.    Neurological Examination:  General / Mental Status: AAO x 3.  No aphasia or dysarthria.  Naming and repetition intact.  Cranial Nerves: VFF x 4.  PERRL.  EOMI x 2, No nystagmus.  B/l V1-V3 equal and intact to light touch and pinprick.  Symmetric facial movement and palate elevation.  B/l hearing equal to finger rub.  5/5 strength with b/l sternocleidomastoid & trapezius.  Midline tongue protrusion, with no atrophy or fasciculations.  Motor: Normal bulk & tone in all four extremities.  At least 3/5 strength throughout right upper and right lower extremities, both of which show downward drift and spasticity.  5/5 strength throughout left upper and left lower extremities, without downward drift and spasticity.  Sensory: Diminished to light touch and pinprick in right upper and right lower extremities.  Intact to light touch and pinprick in left upper and left lower extremities.  Reflex: 2+ and symmetric at b/l biceps, triceps, brachioradialis, patellae, and ankles.  Downgoing toes b/l.  Coordination: No dysmetria with b/l finger-to-nose and heel raise tests.  Gait and Romberg testing deferred due to right-sided weakness.      NIHSS Score:    LOC - 0  LOC Questions - 0  LOC Commands - 0  Gaze Preference - 0  Visual Fields - 0  Facial Palsy - 0  Motor Arm Left - 0  Motor Arm Right - 1  Motor Leg Left - 0  Motor Leg Right - 1  Limb Ataxia - 0  Sensory - 1  Language - 0  Speech - 0  Extinction - 0    NIHSS Score Total: 3    Modified [place] Scale: 2      Labs:    03-12    135  |  106  |  9   ----------------------------<  87  3.5   |  22  |  0.87    Ca    8.1<L>      12 Mar 2020 07:05  Phos  2.3     03-12  Mg     2.0     03-12    TPro  7.4  /  Alb  3.3<L>  /  TBili  0.5  /  DBili  x   /  AST  19  /  ALT  23  /  AlkPhos  85  03-12    Hemoglobin A1C, Whole Blood in AM (03.11.20 @ 09:40)    Hemoglobin A1C, Whole Blood: 5.7%    Lipid Profile in AM (03.11.20 @ 08:20)    Total Cholesterol/HDL Ratio Measurement: 2.8 RATIO    Cholesterol, Serum: 140 mg/dL    Triglycerides, Serum: 46 mg/dL    HDL Cholesterol, Serum: 50 mg/dL    Direct LDL: 81 mg/dL      Neuroimaging:    CT Head (3/10/20): Chronic left PCA territory infarct    CTA Head/Neck (3/11/20):  - Subacute/Chronic left thalamic infarct  - No intracranial or neck vessel abnormalities    Echo (3/11/20):  - LVEF > 55%  - Trace tricuspid, pulmonic, and aortic regurgitation  - No cardiac thrombus or intracardiac shunt identified      Assessment:  49 LHF with hypertensive headache and chronic left PCA territory infarct secondary to hypertension vs. cardioembolic source      Recommendations:    1. Stroke workup  - Q4H Neurochecks & Vital signs  - Continue Telemetry while inpatient    2. Secondary stroke prevention  - Aspirin 81mg daily  - Clopidogrel 75mg daily x 21 days  - Atorvastatin 40mg QHS  - Treat BP if over 120/80 (out of time window for permissive hypertension)       - Continue verapamil 80mg TID to treat hypertension and chronic daily headache  - Continue PT/OT with plan for inpatient rehabilitation upon discharge  - DVT ppx    3. Routine follow-up in Neurology / Stroke clinic      Please contact the Neurology consult service with any questions.    Yg Cyr MD   of Neurology  [facility] at [facility]

## 2020-03-12 NOTE — SWALLOW BEDSIDE ASSESSMENT ADULT - SWALLOW EVAL: DIAGNOSIS
Pt p/w s&s oropharyngeal dysphagia, c/b impaired bolus formation, slow bolus transport, base-of-tongue pumping, significant delay w/ initiation of swallow trigger, overt s/s aspiration on nectar & thin liquid trials. Less coughing noted w/ postural change

## 2020-03-12 NOTE — PROGRESS NOTE ADULT - SUBJECTIVE AND OBJECTIVE BOX
PGY1 Note discussed with Supervising Resident and Primary Attending.    Patient is a 49y old  Female who presents with a chief complaint of R sided Numbness and weakness (11 Mar 2020 21:45)      INTERVAL HPI/OVERNIGHT EVENTS :    No acute event overnight reported by overnight team and nurses.   Pt remained hemodynamically stable.  Pt seen and examined  at bed side.  pt right arm- unable to lift it and some cerebellar tremer also appearing  pt right side is weak    ***********************************************************************************************************    MEDICATIONS  (STANDING):  amLODIPine   Tablet 5 milliGRAM(s) Oral daily  aspirin  chewable 81 milliGRAM(s) Oral daily  atorvastatin 80 milliGRAM(s) Oral at bedtime  clopidogrel Tablet 75 milliGRAM(s) Oral daily  dextrose 5% + sodium chloride 0.9%. 1000 milliLiter(s) (70 mL/Hr) IV Continuous <Continuous>  enoxaparin Injectable 40 milliGRAM(s) SubCutaneous daily  ergocalciferol 69390 Unit(s) Oral <User Schedule>  influenza   Vaccine 0.5 milliLiter(s) IntraMuscular once  potassium phosphate IVPB 30 milliMole(s) IV Intermittent once  verapamil 40 milliGRAM(s) Oral three times a day    MEDICATIONS  (PRN):  acetaminophen   Tablet .. 650 milliGRAM(s) Oral every 6 hours PRN Temp greater or equal to 38C (100.4F), Mild Pain (1 - 3)  ketorolac   Injectable 30 milliGRAM(s) IV Push every 6 hours PRN Severe Pain (7 - 10)  traMADol 50 milliGRAM(s) Oral every 8 hours PRN Moderate Pain (4 - 6)      ***********************************************************************************************************    Allergies    No Known Allergies    Intolerances        ***********************************************************************************************************    REVIEW OF SYSTEMS :  *  CONSTITUTIONAL      : No Fever,   * EYES                             : No eye pain ,  * RESPIRATORY             : No Cough,  * CARDIOVASCULAR     : No Chest pain,  * GASTROINTESTINAL  : No Abdominal or Epigastric pain. No Nausea, Vomiting  * GENITOURINARY        : No Dysuria ,  * NEUROLOGICAL          : No Headaches, pt has weakness on rt side   * MUSCULOSKELETAL   : No Joint pain  * PSYCHIATRY                 : No Depression   * HEME/LYMPH              : No Easy Bruising  * SKIN                               : No Itching, Burning, Rashes,   ***********************************************************************************************************    Vital Signs Last 24 Hrs  T(C): 36.7 (12 Mar 2020 07:40), Max: 37.5 (11 Mar 2020 15:33)  T(F): 98.1 (12 Mar 2020 07:40), Max: 99.5 (11 Mar 2020 15:33)  HR: 99 (12 Mar 2020 07:40) (82 - 102)  BP: 130/84 (12 Mar 2020 07:40) (130/84 - 179/92)  BP(mean): --  RR: 18 (12 Mar 2020 07:40) (18 - 20)  SpO2: 100% (12 Mar 2020 07:40) (96% - 100%)    ***********************************************************************************************************    PHYSICAL EXAM :  * GENERAL                 : NAD, Well-groomed, Well-developed  * HEAD                       :  Atraumatic, Normocephalic  * EYES                         :  Conjunctiva and Sclera clear  * ENT                           : Moist Mucous Membranes  * NECK                         : Supple, No JVD, Normal Thyroid  * CHEST/LUNG           : Clear to Auscultation bilaterally; No Rales,  * HEART                       : Regular Rate and Rhythm; No murmurs,  * ABDOMEN                : Soft, Non-tender, Non-distended; Bowel Sounds present  * NERVOUS SYSTEM  :  Alert & Oriented X3, Good Concentration; right sided weakness and flattening of right nasolabial fold. unable to lift right arm and has cerebellar tremor appearing in right side.  **********************************************************************************************************  LABS:                          12.0   8.43  )-----------( 348      ( 12 Mar 2020 07:05 )             36.8     03-12    135  |  106  |  9   ----------------------------<  87  3.5   |  22  |  0.87    Ca    8.1<L>      12 Mar 2020 07:05  Phos  2.3     03-12  Mg     2.0     03-12    TPro  7.4  /  Alb  3.3<L>  /  TBili  0.5  /  DBili  x   /  AST  19  /  ALT  23  /  AlkPhos  85  03-12    PT/INR - ( 10 Mar 2020 18:19 )   PT: 12.6 sec;   INR: 1.13 ratio         PTT - ( 10 Mar 2020 18:19 )  PTT:26.5 sec    CAPILLARY BLOOD GLUCOSE          **********************************************************************************************************    RADIOLOGY & ADDITIONAL TESTS:   No radiological imaging was required    Imaging Personally Reviewed   :  [x ] YES  [ ] NO    Consultant(s) Notes Reviewed :  [x ] YES  [ ] NO

## 2020-03-12 NOTE — SWALLOW BEDSIDE ASSESSMENT ADULT - SLP PERTINENT HISTORY OF CURRENT PROBLEM
49 y F from home walks independently with Mhx of HTN ( non compliant with meds), Sickle cell trait( never received any transfusion) and PSHx of heart surgery?( for some clot) presented to ED with Numbness, tingling and weakness of the Right side. Admitted to  for Tele.

## 2020-03-12 NOTE — PROGRESS NOTE ADULT - SUBJECTIVE AND OBJECTIVE BOX
HPI:  49 y F from home walks independently with Mhx of HTN ( non compliant with meds), Sickle cell trait( never received any transfusion) and PSHx of heart surgery?( for some clot) presented to ED with Numbness, tingling and weakness of the Right side since yesterday. As per pt she was last seen normal 7am yesterday. Pt started having Numbness of the Right leg yesterday  which resolved after some time. Today pt woke up around 7 am with dizziness so went back to sleep. Pt then woke up at 10:30 am and noticed Numbness and tingling to the right side of face, arm and leg associated with weakness . Symptoms improve after 1 hr. Pt is having severe one sided headache every day for last 1 month. Pt is non compliant with her BP medication and donot check her BP at home   Pt denies any prior episode of numbness , h/o migraine, blurry vision, urinary or bladder incontinence ,seizures, CP, SOB, cough , Abd pain , nausea, vomiting or any urinary symptoms     ED course:   Vitals : Afebrile . 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   Utox -ve     SH : Denies Smoking, alcohol or drug use (10 Mar 2020 19:19)      Patient is a 49y old  Female who presents with a chief complaint of R sided Numbness and weakness (12 Mar 2020 12:52)      INTERVAL HPI/OVERNIGHT EVENTS:  T(C): 36.6 (03-12-20 @ 11:33), Max: 37.5 (03-11-20 @ 15:33)  HR: 84 (03-12-20 @ 11:33) (82 - 101)  BP: 139/91 (03-12-20 @ 11:33) (130/84 - 179/92)  RR: 18 (03-12-20 @ 11:33) (18 - 20)  SpO2: 100% (03-12-20 @ 11:33) (98% - 100%)  Wt(kg): --  I&O's Summary    11 Mar 2020 07:01  -  12 Mar 2020 07:00  --------------------------------------------------------  IN: 200 mL / OUT: 300 mL / NET: -100 mL        REVIEW OF SYSTEMS: denies fever, chills, SOB, palpitations, chest pain, abdominal pain, nausea, vomitting, diarrhea, constipation, dizziness    MEDICATIONS  (STANDING):  aspirin  chewable 81 milliGRAM(s) Oral daily  atorvastatin 80 milliGRAM(s) Oral at bedtime  clopidogrel Tablet 75 milliGRAM(s) Oral daily  dextrose 5% + sodium chloride 0.9%. 1000 milliLiter(s) (70 mL/Hr) IV Continuous <Continuous>  enoxaparin Injectable 40 milliGRAM(s) SubCutaneous daily  ergocalciferol 96773 Unit(s) Oral <User Schedule>  influenza   Vaccine 0.5 milliLiter(s) IntraMuscular once  verapamil 80 milliGRAM(s) Oral every 8 hours    MEDICATIONS  (PRN):  acetaminophen   Tablet .. 650 milliGRAM(s) Oral every 6 hours PRN Temp greater or equal to 38C (100.4F), Mild Pain (1 - 3)  ketorolac   Injectable 30 milliGRAM(s) IV Push every 6 hours PRN Severe Pain (7 - 10)  traMADol 50 milliGRAM(s) Oral every 8 hours PRN Moderate Pain (4 - 6)      PHYSICAL EXAM:  GENERAL: NAD, well-groomed, well-developed  HEAD:  Atraumatic, Normocephalic  EYES: EOMI, PERRLA, conjunctiva and sclera clear  ENMT: No tonsillar erythema, exudates, or enlargement; Moist mucous membranes, Good dentition, No lesions  NECK: Supple, No JVD, Normal thyroid  NERVOUS SYSTEM:  Alert & Oriented X3, Good concentration; Motor Strength 5/5 B/L upper and lower extremities; DTRs 2+ intact and symmetric  CHEST/LUNG: Clear to percussion bilaterally; No rales, rhonchi, wheezing, or rubs  HEART: Regular rate and rhythm; No murmurs, rubs, or gallops  ABDOMEN: Soft, Nontender, Nondistended; Bowel sounds present  EXTREMITIES:  2+ Peripheral Pulses, No clubbing, cyanosis, or edema  LYMPH: No lymphadenopathy noted  SKIN: No rashes or lesions  LABS:                        12.0   8.43  )-----------( 348      ( 12 Mar 2020 07:05 )             36.8     03-12    135  |  106  |  9   ----------------------------<  87  3.5   |  22  |  0.87    Ca    8.1<L>      12 Mar 2020 07:05  Phos  2.3     03-12  Mg     2.0     03-12    TPro  7.4  /  Alb  3.3<L>  /  TBili  0.5  /  DBili  x   /  AST  19  /  ALT  23  /  AlkPhos  85  03-12    PT/INR - ( 10 Mar 2020 18:19 )   PT: 12.6 sec;   INR: 1.13 ratio         PTT - ( 10 Mar 2020 18:19 )  PTT:26.5 sec    CAPILLARY BLOOD GLUCOSE

## 2020-03-12 NOTE — SWALLOW BEDSIDE ASSESSMENT ADULT - ORAL PHASE
base-of-tongue pumping/Decreased anterior-posterior movement of the bolus/Delayed oral transit time base-of-tongue pumping/Decreased anterior-posterior movement of the bolus

## 2020-03-12 NOTE — PHYSICAL THERAPY INITIAL EVALUATION ADULT - ACTIVE RANGE OF MOTION EXAMINATION, REHAB EVAL
Left UE Active ROM was WFL (within functional limits)/Left LE Active ROM was WFL (within functional limits)/Right UE/LE AAROM due to weakness

## 2020-03-12 NOTE — SWALLOW BEDSIDE ASSESSMENT ADULT - ORAL PREPARATORY PHASE
Reduced oral grading/poor pucker & seal to spoon presentation poor pucker to cup presentation/Reduced oral grading poor pucker to cup presentation

## 2020-03-12 NOTE — CONSULT NOTE ADULT - SUBJECTIVE AND OBJECTIVE BOX
CHIEF COMPLAINT:Patient is a 49y old  Female who presents with a chief complaint of R sided Numbness and weakness .      HPI:  49 y F from home walks independently with PMHX of HTN ( non compliant with meds), Sickle cell trait( never received any transfusion) and PSHx of heart surgery?( for some clot) presented to ED with Numbness, tingling and weakness of the Right side since yesterday. As per pt she was last seen normal 7am yesterday. Pt started having Numbness of the Right leg yesterday  which resolved after some time. Today pt woke up around 7 am with dizziness so went back to sleep. Pt then woke up at 10:30 am and noticed Numbness and tingling to the right side of face, arm and leg associated with weakness . Symptoms improve after 1 hr. Pt is having severe one sided headache every day for last 1 month. Pt is non compliant with her BP medication and donot check her BP at home Pt denies any prior episode of numbness , h/o migraine, blurry vision, urinary or bladder incontinence ,seizures, CP, SOB, cough , Abd pain , nausea, vomiting or any urinary symptoms     ED course:   Vitals : Afebrile . 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 1000 milliLiter(s) (70 mL/Hr) IV Continuous <Continuous>  enoxaparin Injectable 40 milliGRAM(s) SubCutaneous daily  ergocalciferol 24200 Unit(s) Oral <User Schedule>  influenza   Vaccine 0.5 milliLiter(s) IntraMuscular once  verapamil 80 milliGRAM(s) Oral every 8 hours    MEDICATIONS  (PRN):  acetaminophen   Tablet .. 650 milliGRAM(s) Oral every 6 hours PRN Temp greater or equal to 38C (100.4F), Mild Pain (1 - 3)  ketorolac   Injectable 30 milliGRAM(s) IV Push every 6 hours PRN Severe Pain (7 - 10)  traMADol 50 milliGRAM(s) Oral every 8 hours PRN Moderate Pain (4 - 6)      FAMILY HISTORY:  FH: HTN (hypertension)      SOCIAL HISTORY:    [x ] Non-smoker    [ x] Alcohol-denies    Allergies    No Known Allergies    Intolerances    	    REVIEW OF SYSTEMS:  CONSTITUTIONAL: No fever, weight loss, or fatigue  EYES: No eye pain, visual disturbances, or discharge  ENT:  No difficulty hearing, tinnitus, vertigo; No sinus or throat pain  NECK: No pain or stiffness  RESPIRATORY: No cough, wheezing, chills or hemoptysis; No Shortness of Breath  CARDIOVASCULAR: No chest pain, palpitations, passing out, dizziness, or leg swelling  GASTROINTESTINAL: No abdominal or epigastric pain. No nausea, vomiting, or hematemesis; No diarrhea or constipation. No melena or hematochezia.  GENITOURINARY: No dysuria, frequency, hematuria, or incontinence  NEUROLOGICAL: No headaches, memory loss, loss of strength, numbness, or tremors  SKIN: No itching, burning, rashes, or lesions   LYMPH Nodes: No enlarged glands  ENDOCRINE: No heat or cold intolerance; No hair loss  MUSCULOSKELETAL: No joint pain or swelling; No muscle, back, or extremity pain  PSYCHIATRIC: No depression, anxiety, mood swings, or difficulty sleeping  HEME/LYMPH: No easy bruising, or bleeding gums  ALLERGY AND IMMUNOLOGIC: No hives or eczema	        PHYSICAL EXAM:  T(C): 36.6 (03-12-20 @ 11:33), Max: 37.5 (03-11-20 @ 15:33)  HR: 84 (03-12-20 @ 11:33) (82 - 101)  BP: 139/91 (03-12-20 @ 11:33) (130/84 - 179/92)  RR: 18 (03-12-20 @ 11:33) (18 - 20)  SpO2: 100% (03-12-20 @ 11:33) (98% - 100%)  Wt(kg): --  I&O's Summary    11 Mar 2020 07:01  -  12 Mar 2020 07:00  --------------------------------------------------------  IN: 200 mL / OUT: 300 mL / NET: -100 mL        Appearance: Normal	  HEENT:   Normal oral mucosa, PERRL, EOMI	  Lymphatic: No lymphadenopathy  Cardiovascular: Normal S1 S2, No JVD, No murmurs, No edema  Respiratory: Lungs clear to auscultation	  Psychiatry: A & O x 3, Mood & affect appropriate  Gastrointestinal:  Soft, Non-tender, + BS	  Skin: No rashes, No ecchymoses, No cyanosis	  Neurologic: Non-focal  Extremities: Normal range of motion, No clubbing, cyanosis or edema  Vascular: Peripheral pulses palpable 2+ bilaterally    	    ECG:  	Sinus tachycardia      LABS:	 	      CARDIAC MARKERS ( 11 Mar 2020 08:20 )  <0.015 ng/mL / x     / 70 U/L / x     / <1.0 ng/mL                              12.0   8.43  )-----------( 348      ( 12 Mar 2020 07:05 )             36.8     03-12    135  |  106  |  9   ----------------------------<  87  3.5   |  22  |  0.87    Ca    8.1<L>      12 Mar 2020 07:05  Phos  2.3     03-12  Mg     2.0     03-12    TPro  7.4  /  Alb  3.3<L>  /  TBili  0.5  /  DBili  x   /  AST  19  /  ALT  23  /  AlkPhos  85  03-12    Vitamin D, 25-Hydroxy: 12.9: VITD Interpretive Data Result:  30.0-80.0 ng/mL Optimal levels (Reference Range)  >80.0 ng/mL Toxicity possible  20.0-29.0 ng/mL Insufficiency  10.0-19.0 ng/mL Mild to Moderate Deficiency  <10.0 ng/mL Severe Deficiency  Optimal levels for 25-Hydroxy vitamin D are 30.0 ng/mL and above based  upon the Endocrine Society guidelines 2011.  However, there is a lack of  consensus on this and the [place] [employer] recommends 20.0 ng/mL and  above as optimal levels.  Vitamin D results may vary depending on the  method of analysis.  The Roche khadijah e801 electrochemiluminescent  immunoassay method measures both D2 and D3. ng/mL (03.11.20 @ 15:29)    Echocardiogram:    OBSERVATIONS:  Mitral Valve: Normal mitral valve. Mild mitral  regurgitation.  Aortic Root: Normal aortic root.  Aortic Valve: Aortic valve leaflet morphology not well  visualized, opens normally. Trace aortic regurgitation.  Left Atrium: Normal left atrium.  LA volume index = 19  cc/m2.  Left Ventricle: Normal left ventricular systolic function.  Normal left ventricular internal dimensions and wall  thicknesses.  Right Heart: Normal right atrium. Normal right ventricular  size and function. There is trace tricuspid regurgitation.  There is trace pulmonic regurgitation.  Pericardium/PleuraNormal pericardium with no pericardial  effusion.  Hemodynamic: Incomplete tricuspid regurgitation jet  precludes accurate assessment of pulmonary artery systolic  pressure. Agitated saline injection was negative for  intracardiac shunt.        EXAM:  CT ANGIO NECK (W)AW IC                          EXAM:  CT ANGIO BRAIN (W)AW IC                            PROCEDURE DATE:  03/11/2020          INTERPRETATION:  INDICATION: Stroke    TECHNIQUE:  A thin section CT study of the head and  neck was conducted during rapid infusion of intravenous contrast (power injected).  In addition to the axial data, reformatted images were generated using a 3D workstation. 100 cc Omnipaque 350 was administered.    FINDINGS:      AORTIC ARCH no dissection or aneurysm dilatation is noted. Major vessel origins are patent.  COMMON CAROTID ARTERIES: Bilaterally patent with tortuosity  RIGHT BIFURCATION: Widely patent  LEFT BIFURCATION: Widely patent  INTERNAL CAROTID ARTERIES: Bilaterally patent with tortuosity  VERTEBRALS bilaterally patent with left-sided dominance  MISCELLANEOUS:  Degenerative changes are noted in the cervical spine      BRAIN   there is hypodensity in the left  thalamic region suggesting a small lacunar infarct    The petrous, cavernous, and supraclinoid carotid arteries are bilaterally patent    The anterior, and middle cerebral arteries are widely patent.    The vertebrobasilar system is widely patent. The posterior cerebral arteries are bilaterally widely patent.    The venous sinuses are patent.    IMPRESSION    Widely patent head and neck vasculature. No significant stenosis, occlusion, or dissection.    Initial noncontrast images suggest a small lacunar infarct in the left thalamus. Follow-up MR imaging recommended forfurther evaluation.

## 2020-03-12 NOTE — SWALLOW BEDSIDE ASSESSMENT ADULT - ASR SWALLOW ASPIRATION MONITOR
change of breathing pattern/position upright (90Y)/gurgly voice/fever/pneumonia/throat clearing/upper respiratory infection/oral hygiene/cough

## 2020-03-12 NOTE — SWALLOW BEDSIDE ASSESSMENT ADULT - PHARYNGEAL PHASE
Decreased laryngeal elevation/Delayed cough post oral intake/Delayed pharyngeal swallow/Cough post oral intake Multiple swallows/Delayed pharyngeal swallow/Delayed throat clear post oral intake/Decreased laryngeal elevation Delayed pharyngeal swallow/Decreased laryngeal elevation/continued cough after nectar thick liquid trials/Cough post oral intake/Throat clear post oral intake/Multiple swallows Complaints of pharyngeal stasis/Throat clear post oral intake/Cough post oral intake/Multiple swallows

## 2020-03-12 NOTE — PROGRESS NOTE ADULT - PROBLEM SELECTOR PLAN 2
Pt non compliant with her med  -started on amlodipine 5 mg   -monitor BP   -will started on verapamil 40mg tid as per neurology note after 24 hr window Pt non compliant with her med  -started on amlodipine 5 mg , verapamil 40 tid  -monitor BP

## 2020-03-12 NOTE — SWALLOW BEDSIDE ASSESSMENT ADULT - ASR SWALLOW LINGUAL MOBILITY
impaired protrusion/impaired anterior elevation/impaired left lateral movement/impaired right lateral movement/overall weak

## 2020-03-12 NOTE — PROGRESS NOTE ADULT - PROBLEM SELECTOR PLAN 1
p/w Right sided numbness,tingling and weakness   - NIHSS increased overnight to 5, pt not a candidate for TPA  - CTH : subacute to chronic L PCA infarct , no hemorrhage   - ECG : NSR   - S/S EVal  - started on Asp 81 mg and high intensity statins    - cont tele  - f/u CD, Transthoracic echo with bubble study  ct angio head and neck, us carotid  -  A1c 5.7, Lipid profile wnl ,   f/u Vit B12 ,   - f/u PT eval , official SnS   -neuro checks q4  - Neuro consult Dr Cyr p/w Right sided numbness, tingling and weakness   - NIHSS increased overnight to 5, pt not a candidate for TPA  - CTH : subacute to chronic L PCA infarct , no hemorrhage   - ECG : NSR   - S/S EVal  - started on Asp 81 mg and high intensity statins    - cont tele  - f/u Transthoracic echo with bubble study  -f/u ct angio head and neck showed =Widely patent head and neck vasculature. No significant stenosis, occlusion, or dissection. Initial noncontrast images suggest a small lacunar infarct in the left thalamus.  -  A1c 5.7, Lipid profile wnl ,   Vit B12 wnl ,   - f/u PT eval ,   -official SnS   -neuro checks q4  - Neuro consult Dr Cyr  pt on asprin, clopidogrel, statin

## 2020-03-12 NOTE — PHYSICAL THERAPY INITIAL EVALUATION ADULT - GENERAL OBSERVATIONS, REHAB EVAL
Consult received, EMR, radiology and labs reviewed. Patient received supine in bed, NAD, Right UE/LE weakness, Left PCA. Patient agreed to EVALUATION from Physical Therapist.

## 2020-03-12 NOTE — SWALLOW BEDSIDE ASSESSMENT ADULT - SWALLOW EVAL: RECOMMENDED FEEDING/EATING TECHNIQUES
crush medication (when feasible)/position upright (90 degrees)/allow for swallow between intakes/small sips/bites/tuck chin/Chin Tuck during swallow, to narrow the pharynx and increase airway protection./oral hygiene/alternate food with liquid

## 2020-03-13 LAB
ALBUMIN SERPL ELPH-MCNC: 3.2 G/DL — LOW (ref 3.5–5)
ALP SERPL-CCNC: 86 U/L — SIGNIFICANT CHANGE UP (ref 40–120)
ALT FLD-CCNC: 26 U/L DA — SIGNIFICANT CHANGE UP (ref 10–60)
ANION GAP SERPL CALC-SCNC: 9 MMOL/L — SIGNIFICANT CHANGE UP (ref 5–17)
AST SERPL-CCNC: 22 U/L — SIGNIFICANT CHANGE UP (ref 10–40)
BASOPHILS # BLD AUTO: 0.02 K/UL — SIGNIFICANT CHANGE UP (ref 0–0.2)
BASOPHILS NFR BLD AUTO: 0.3 % — SIGNIFICANT CHANGE UP (ref 0–2)
BILIRUB SERPL-MCNC: 0.3 MG/DL — SIGNIFICANT CHANGE UP (ref 0.2–1.2)
BUN SERPL-MCNC: 10 MG/DL — SIGNIFICANT CHANGE UP (ref 7–18)
CALCIUM SERPL-MCNC: 7.9 MG/DL — LOW (ref 8.4–10.5)
CHLORIDE SERPL-SCNC: 110 MMOL/L — HIGH (ref 96–108)
CO2 SERPL-SCNC: 20 MMOL/L — LOW (ref 22–31)
CREAT SERPL-MCNC: 0.77 MG/DL — SIGNIFICANT CHANGE UP (ref 0.5–1.3)
EOSINOPHIL # BLD AUTO: 0.09 K/UL — SIGNIFICANT CHANGE UP (ref 0–0.5)
EOSINOPHIL NFR BLD AUTO: 1.3 % — SIGNIFICANT CHANGE UP (ref 0–6)
GLUCOSE SERPL-MCNC: 94 MG/DL — SIGNIFICANT CHANGE UP (ref 70–99)
HCT VFR BLD CALC: 38.1 % — SIGNIFICANT CHANGE UP (ref 34.5–45)
HGB BLD-MCNC: 12.3 G/DL — SIGNIFICANT CHANGE UP (ref 11.5–15.5)
IMM GRANULOCYTES NFR BLD AUTO: 0.3 % — SIGNIFICANT CHANGE UP (ref 0–1.5)
LYMPHOCYTES # BLD AUTO: 0.89 K/UL — LOW (ref 1–3.3)
LYMPHOCYTES # BLD AUTO: 12.4 % — LOW (ref 13–44)
MAGNESIUM SERPL-MCNC: 1.9 MG/DL — SIGNIFICANT CHANGE UP (ref 1.6–2.6)
MCHC RBC-ENTMCNC: 22.1 PG — LOW (ref 27–34)
MCHC RBC-ENTMCNC: 32.3 GM/DL — SIGNIFICANT CHANGE UP (ref 32–36)
MCV RBC AUTO: 68.5 FL — LOW (ref 80–100)
MONOCYTES # BLD AUTO: 0.68 K/UL — SIGNIFICANT CHANGE UP (ref 0–0.9)
MONOCYTES NFR BLD AUTO: 9.5 % — SIGNIFICANT CHANGE UP (ref 2–14)
NEUTROPHILS # BLD AUTO: 5.46 K/UL — SIGNIFICANT CHANGE UP (ref 1.8–7.4)
NEUTROPHILS NFR BLD AUTO: 76.2 % — SIGNIFICANT CHANGE UP (ref 43–77)
NRBC # BLD: 0 /100 WBCS — SIGNIFICANT CHANGE UP (ref 0–0)
PHOSPHATE SERPL-MCNC: 2.9 MG/DL — SIGNIFICANT CHANGE UP (ref 2.5–4.5)
PLATELET # BLD AUTO: 307 K/UL — SIGNIFICANT CHANGE UP (ref 150–400)
POTASSIUM SERPL-MCNC: 3.6 MMOL/L — SIGNIFICANT CHANGE UP (ref 3.5–5.3)
POTASSIUM SERPL-SCNC: 3.6 MMOL/L — SIGNIFICANT CHANGE UP (ref 3.5–5.3)
PROT SERPL-MCNC: 7.2 G/DL — SIGNIFICANT CHANGE UP (ref 6–8.3)
RBC # BLD: 5.56 M/UL — HIGH (ref 3.8–5.2)
RBC # FLD: 17.5 % — HIGH (ref 10.3–14.5)
SODIUM SERPL-SCNC: 139 MMOL/L — SIGNIFICANT CHANGE UP (ref 135–145)
WBC # BLD: 7.16 K/UL — SIGNIFICANT CHANGE UP (ref 3.8–10.5)
WBC # FLD AUTO: 7.16 K/UL — SIGNIFICANT CHANGE UP (ref 3.8–10.5)

## 2020-03-13 PROCEDURE — 74230 X-RAY XM SWLNG FUNCJ C+: CPT | Mod: 26

## 2020-03-13 PROCEDURE — 99233 SBSQ HOSP IP/OBS HIGH 50: CPT

## 2020-03-13 RX ADMIN — ATORVASTATIN CALCIUM 80 MILLIGRAM(S): 80 TABLET, FILM COATED ORAL at 22:43

## 2020-03-13 RX ADMIN — Medication 80 MILLIGRAM(S): at 05:32

## 2020-03-13 RX ADMIN — Medication 80 MILLIGRAM(S): at 22:43

## 2020-03-13 RX ADMIN — CLOPIDOGREL BISULFATE 75 MILLIGRAM(S): 75 TABLET, FILM COATED ORAL at 12:01

## 2020-03-13 RX ADMIN — ENOXAPARIN SODIUM 40 MILLIGRAM(S): 100 INJECTION SUBCUTANEOUS at 12:01

## 2020-03-13 RX ADMIN — Medication 81 MILLIGRAM(S): at 12:01

## 2020-03-13 RX ADMIN — Medication 80 MILLIGRAM(S): at 15:52

## 2020-03-13 NOTE — PROGRESS NOTE ADULT - SUBJECTIVE AND OBJECTIVE BOX
Unchanged neuro exam, still with right-sided hemiparesis and sensory deficit, and mild dysphagia.    Recs:  - Continue secondary stroke prevention  - Treat BP > 120/80  - Continue PT/OT with plan to discharge to inpatient rehabilitation      NOTE TO BE COMPLETED - PLEASE REFER TO ABOVE ONLY AND IGNORE INFORMATION BELOW    Neurology Follow up note    Name  GEORGIANA RAMIREZ    HPI:  49 y F from home walks independently with Mhx of HTN ( non compliant with meds), Sickle cell trait( never received any transfusion) and PSHx of heart surgery?( for some clot) presented to ED with Numbness, tingling and weakness of the Right side since yesterday. As per pt she was last seen normal 7am yesterday. Pt started having Numbness of the Right leg yesterday  which resolved after some time. Today pt woke up around 7 am with dizziness so went back to sleep. Pt then woke up at 10:30 am and noticed Numbness and tingling to the right side of face, arm and leg associated with weakness . Symptoms improve after 1 hr. Pt is having severe one sided headache every day for last 1 month. Pt is non compliant with her BP medication and donot check her BP at home   Pt denies any prior episode of numbness , h/o migraine, blurry vision, urinary or bladder incontinence ,seizures, CP, SOB, cough , Abd pain , nausea, vomiting or any urinary symptoms     ED course:   Vitals : Afebrile . 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   Utox -ve     SH : Denies Smoking, alcohol or drug use (10 Mar 2020 19:19)      Interval History -        Subjective:        MEDICATIONS  (STANDING):  aspirin  chewable 81 milliGRAM(s) Oral daily  atorvastatin 80 milliGRAM(s) Oral at bedtime  clopidogrel Tablet 75 milliGRAM(s) Oral daily  enoxaparin Injectable 40 milliGRAM(s) SubCutaneous daily  ergocalciferol 17874 Unit(s) Oral <User Schedule>  influenza   Vaccine 0.5 milliLiter(s) IntraMuscular once  verapamil 80 milliGRAM(s) Oral every 8 hours    MEDICATIONS  (PRN):  acetaminophen   Tablet .. 650 milliGRAM(s) Oral every 6 hours PRN Temp greater or equal to 38C (100.4F), Mild Pain (1 - 3)  ketorolac   Injectable 30 milliGRAM(s) IV Push every 6 hours PRN Severe Pain (7 - 10)  traMADol 50 milliGRAM(s) Oral every 8 hours PRN Moderate Pain (4 - 6)      Allergies    No Known Allergies    Intolerances        Review of Systems:  General: [ ] None, [ ] chills, [ ]fatigue, [ ] fevers  Skin: [ ] None, [ ] rash   HEENT: [ ] None, [ ] head injury, [ ] blurred vision, [ ] double vision, [ ] eye pain, [ ] visual loss, [ ] hearing loss, [ ] deafness, [ ] ear pain, [ ] ringing in the ears, [ ] vertigo, [ ] sinus pain, [ ] voice changes  Neck: [ ] None, [ ] neck stiffness  Respiratory: [ ] None, [ ] cough, [ ] difficulty breathing  Cardiovascular: [ ] None, [ ] calf cramps, [ ] chest pain, [ ] leg pain, [ ] swelling, [ ] rapid heart rate, [ ] shortness of breath  Gastrointestinal: [ ] None, [ ] abdominal pain, [ ] nausea, [ ] vomiting  Musculoskeletal: [ ] None, [ ] back pain, [ ] joint pain, [ ] joint stiffness, [ ] leg cramps, [ ] muscle atrophy, [ ] muscle cramps, [ ] muscle weakness, [ ] swelling of extremities  Neurological: [ ] None, [ ] Dizziness, [ ] decreased memory, [ ] fainting, [ ] focal neurological symptoms, [ ] headaches, [ ] incontinence of stool, [ ] incontinence of urine, [ ] loss of consciousness, [ ] numbness, [ ] seizures, [ ] spinning sensation, [ ] stroke, [ ] trouble walking, [ ] unsteadiness, [ ] visual changes, [ ] weakness  Psychiatric: [ ] None,  [ ] depression, [ ] anxiety, [ ] hallucinations, [ ] inability to concentrate, [ ] mood changes, [ ] panic attacks  Hematology: [ ] None,  [ ] blood clots, [ ] spontaneous bleeding      Objective:   Vital Signs Last 24 Hrs  T(C): 36.7 (13 Mar 2020 19:46), Max: 37.1 (12 Mar 2020 23:45)  T(F): 98.1 (13 Mar 2020 19:46), Max: 98.7 (12 Mar 2020 23:45)  HR: 84 (13 Mar 2020 19:46) (76 - 98)  BP: 144/94 (13 Mar 2020 19:46) (123/70 - 150/104)  BP(mean): --  RR: 18 (13 Mar 2020 19:46) (18 - 18)  SpO2: 99% (13 Mar 2020 19:46) (99% - 100%)    General Exam:   General appearance: No acute distress                 Cardiovascular: Pedal dorsalis pulses intact bilaterally    Neurological Exam:  Mental Status: Orientated to self, date and place.  Attention intact.  No dysarthria, aphasia or neglect.  Knowledge intact.  Registration intact.  Short and long term memory grossly intact.      Cranial Nerves: CN I - not tested.  PERRL, EOMI, VFF, no nystagmus or diplopia.  No APD.  Fundi not visualized bilaterally.  CN V1-3 intact to light touch and pinprick.  No facial asymmetry.  Hearing intact to finger rub bilaterally.  Tongue, uvula and palate midline.  Sternocleidomastoid and Trapezius intact bilaterally.    Motor:   Tone: normal.                  Strength: intact throughout  Pronator drift: none                 Dysmeria: None to finger-nose-finger or heel-shin-heel  No truncal ataxia.    Tremor: No resting, postural or action tremor.  No myoclonus.    Sensation: intact to light touch, pinprick, vibration and proprioception    Deep Tendon Reflexes: 1+ bilateral biceps, triceps, brachioradialis, knee and ankle  Toes flexor bilaterally    Gait: normal and stable.      Other:    03-13    139  |  110<H>  |  10  ----------------------------<  94  3.6   |  20<L>  |  0.77    Ca    7.9<L>      13 Mar 2020 06:34  Phos  2.9     03-13  Mg     1.9     03-13    TPro  7.2  /  Alb  3.2<L>  /  TBili  0.3  /  DBili  x   /  AST  22  /  ALT  26  /  AlkPhos  86  03-13          Radiology    EKG:  tele:  TTE:  EEG:                 Please contact the Neurology consult service with any questions.    Yg Cyr MD   of Neurology  [facility] at [facility] Unchanged neuro exam, still with right-sided hemiparesis and sensory deficit, and mild dysphagia.    Recs:  - Continue secondary stroke prevention  - Treat BP > 120/80  - Continue PT/OT with plan to discharge to inpatient rehabilitation      NOTE TO BE COMPLETED - PLEASE REFER TO ABOVE ONLY AND IGNORE INFORMATION BELOW    Neurology Follow up note    Name  GEORGIANA RAMIREZ    HPI:  49 y F from home walks independently with Mhx of HTN (noncompliant with meds), Sickle cell trait( never received any transfusion) and PSHx of heart surgery?( for some clot) presented to ED with numbness, tingling and weakness of the Right side since yesterday. As per pt she was last seen normal 7am yesterday. Pt started having numbness of the Right leg yesterday  which resolved after some time. Today pt woke up around 7 am with dizziness so went back to sleep. Pt then woke up at 10:30 am and noticed Numbness and tingling to the right side of face, arm and leg associated with weakness. Symptoms improve after 1 hr. Pt is having severe one sided headache every day for last 1 month. Pt is noncompliant with her BP medication and did not check her BP at home   Pt denies any prior episode of numbness, h/o migraine, blurry vision, urinary or bladder incontinence, seizures, CP, SOB, cough, Abd pain, nausea, vomiting or any urinary symptoms.  ED course:   Vitals : Afebrile . 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   Utox -ve     NEURO HPI:  49 LHF presented with recurrent left-sided headache and right upper and lower extremity weakness and numbness, last seen normal yesterday before sleep (approximately 22:00 on 3/9/20).    Interval History -  The patient has had no new neurological events overnight.    MEDICATIONS  (STANDING):  aspirin  chewable 81 milliGRAM(s) Oral daily  atorvastatin 80 milliGRAM(s) Oral at bedtime  clopidogrel Tablet 75 milliGRAM(s) Oral daily  enoxaparin Injectable 40 milliGRAM(s) SubCutaneous daily  ergocalciferol 68573 Unit(s) Oral <User Schedule>  influenza   Vaccine 0.5 milliLiter(s) IntraMuscular once  verapamil 80 milliGRAM(s) Oral every 8 hours    MEDICATIONS  (PRN):  acetaminophen   Tablet .. 650 milliGRAM(s) Oral every 6 hours PRN Temp greater or equal to 38C (100.4F), Mild Pain (1 - 3)  ketorolac   Injectable 30 milliGRAM(s) IV Push every 6 hours PRN Severe Pain (7 - 10)  traMADol 50 milliGRAM(s) Oral every 8 hours PRN Moderate Pain (4 - 6)    Allergies  No Known Allergies    Review of Systems: Fourteen systems reviewed and negative except as in HPI / Interval History.      Objective:   Vital Signs Last 24 Hrs  T(C): 36.7 (13 Mar 2020 19:46), Max: 37.1 (12 Mar 2020 23:45)  T(F): 98.1 (13 Mar 2020 19:46), Max: 98.7 (12 Mar 2020 23:45)  HR: 84 (13 Mar 2020 19:46) (76 - 98)  BP: 144/94 (13 Mar 2020 19:46) (123/70 - 150/104)  RR: 18 (13 Mar 2020 19:46) (18 - 18)  SpO2: 99% (13 Mar 2020 19:46) (99% - 100%)    General Examination:  General: No acute distress  HEENT: Atraumatic, Normocephalic  Respiratory: CTA B/l.  No crackles, rhonchi, or wheezes.  Cardiovascular: RRR.  Normal S1 & S2.  Normal b/l radial and pedal pulses.    Neurological Examination:  General / Mental Status: AAO x 3.  No aphasia or dysarthria.  Naming and repetition intact.  Cranial Nerves: VFF x 4.  PERRL.  EOMI x 2, No nystagmus.  B/l V1-V3 equal and intact to light touch and pinprick.  Symmetric facial movement and palate elevation.  B/l hearing equal to finger rub.  5/5 strength with b/l sternocleidomastoid & trapezius.  Midline tongue protrusion, with no atrophy or fasciculations.  Motor: Normal bulk & tone in all four extremities.  At least 3/5 strength throughout right upper and right lower extremities, both of which show downward drift and spasticity.  5/5 strength throughout left upper and left lower extremities, without downward drift and spasticity.  Sensory: Diminished to light touch and pinprick in right upper and right lower extremities.  Intact to light touch and pinprick in left upper and left lower extremities.  Reflex: 2+ and symmetric at b/l biceps, triceps, brachioradialis, patellae, and ankles.  Downgoing toes b/l.  Coordination: No dysmetria with b/l finger-to-nose and heel raise tests.  Gait and Romberg testing deferred due to right-sided weakness.      NIHSS Score:    LOC - 0  LOC Questions - 0  LOC Commands - 0  Gaze Preference - 0  Visual Fields - 0  Facial Palsy - 0  Motor Arm Left - 0  Motor Arm Right - 1  Motor Leg Left - 0  Motor Leg Right - 1  Limb Ataxia - 0  Sensory - 1  Language - 0  Speech - 0  Extinction - 0    NIHSS Score Total: 3    Modified [place] Scale: 2        Labs:    03-13    139  |  110<H>  |  10  ----------------------------<  94  3.6   |  20<L>  |  0.77    Ca    7.9<L>      13 Mar 2020 06:34  Phos  2.9     03-13  Mg     1.9     03-13    TPro  7.2  /  Alb  3.2<L>  /  TBili  0.3  /  DBili  x   /  AST  22  /  ALT  26  /  AlkPhos  86  03-13      Hemoglobin A1C, Whole Blood in AM (03.11.20 @ 09:40)    Hemoglobin A1C, Whole Blood: 5.7%    Lipid Profile in AM (03.11.20 @ 08:20)    Total Cholesterol/HDL Ratio Measurement: 2.8 RATIO    Cholesterol, Serum: 140 mg/dL    Triglycerides, Serum: 46 mg/dL    HDL Cholesterol, Serum: 50 mg/dL    Direct LDL: 81 mg/dL      Neuroimaging:    CT Head (3/10/20): Chronic left PCA territory infarct    CTA Head/Neck (3/11/20):  - Subacute/Chronic left thalamic infarct  - No intracranial or neck vessel abnormalities    Echo (3/11/20):  - LVEF > 55%  - Trace tricuspid, pulmonic, and aortic regurgitation  - No cardiac thrombus or intracardiac shunt identified      Assessment:  49 LHF with hypertensive headache and chronic left PCA territory infarct secondary to hypertension vs. cardioembolic source      Recommendations:    1. Stroke workup  - Q4H Neurochecks & Vital signs  - Continue Telemetry while inpatient    2. Secondary stroke prevention  - Aspirin 81mg daily  - Clopidogrel 75mg daily x 21 days  - Atorvastatin 40mg QHS  - Treat BP if over 120/80 (out of time window for permissive hypertension)       - Continue verapamil 80mg TID to treat hypertension and chronic daily headache  - Continue PT/OT with plan for inpatient rehabilitation upon discharge  - DVT ppx    3. Routine follow-up in stroke clinic               Please contact the Neurology consult service with any questions.    Yg Cyr MD   of Neurology  [facility] [employer] [facility] at [facility] Neurology Follow up note    Name  GEORGIANA RAMIREZ    HPI:  49 y F from home walks independently with Mhx of HTN (noncompliant with meds), Sickle cell trait( never received any transfusion) and PSHx of heart surgery?( for some clot) presented to ED with numbness, tingling and weakness of the Right side since yesterday. As per pt she was last seen normal 7am yesterday. Pt started having numbness of the Right leg yesterday  which resolved after some time. Today pt woke up around 7 am with dizziness so went back to sleep. Pt then woke up at 10:30 am and noticed Numbness and tingling to the right side of face, arm and leg associated with weakness. Symptoms improve after 1 hr. Pt is having severe one sided headache every day for last 1 month. Pt is noncompliant with her BP medication and did not check her BP at home   Pt denies any prior episode of numbness, h/o migraine, blurry vision, urinary or bladder incontinence, seizures, CP, SOB, cough, Abd pain, nausea, vomiting or any urinary symptoms.  ED course:   Vitals : Afebrile . 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   Utox -ve     NEURO HPI:  49 LHF presented with recurrent left-sided headache and right upper and lower extremity weakness and numbness, last seen normal yesterday before sleep (approximately 22:00 on 3/9/20).    Interval History -  The patient has had no new neurological events overnight.    MEDICATIONS  (STANDING):  aspirin  chewable 81 milliGRAM(s) Oral daily  atorvastatin 80 milliGRAM(s) Oral at bedtime  clopidogrel Tablet 75 milliGRAM(s) Oral daily  enoxaparin Injectable 40 milliGRAM(s) SubCutaneous daily  ergocalciferol 40485 Unit(s) Oral <User Schedule>  influenza   Vaccine 0.5 milliLiter(s) IntraMuscular once  verapamil 80 milliGRAM(s) Oral every 8 hours    MEDICATIONS  (PRN):  acetaminophen   Tablet .. 650 milliGRAM(s) Oral every 6 hours PRN Temp greater or equal to 38C (100.4F), Mild Pain (1 - 3)  ketorolac   Injectable 30 milliGRAM(s) IV Push every 6 hours PRN Severe Pain (7 - 10)  traMADol 50 milliGRAM(s) Oral every 8 hours PRN Moderate Pain (4 - 6)    Allergies  No Known Allergies    Review of Systems: Fourteen systems reviewed and negative except as in HPI / Interval History.      Objective:   Vital Signs Last 24 Hrs  T(C): 36.7 (13 Mar 2020 19:46), Max: 37.1 (12 Mar 2020 23:45)  T(F): 98.1 (13 Mar 2020 19:46), Max: 98.7 (12 Mar 2020 23:45)  HR: 84 (13 Mar 2020 19:46) (76 - 98)  BP: 144/94 (13 Mar 2020 19:46) (123/70 - 150/104)  RR: 18 (13 Mar 2020 19:46) (18 - 18)  SpO2: 99% (13 Mar 2020 19:46) (99% - 100%)    General Examination:  General: No acute distress  HEENT: Atraumatic, Normocephalic  Respiratory: CTA B/l.  No crackles, rhonchi, or wheezes.  Cardiovascular: RRR.  Normal S1 & S2.  Normal b/l radial and pedal pulses.    Neurological Examination:  General / Mental Status: AAO x 3.  No aphasia or dysarthria.  Naming and repetition intact.  Cranial Nerves: VFF x 4.  PERRL.  EOMI x 2, No nystagmus.  B/l V1-V3 equal and intact to light touch and pinprick.  Symmetric facial movement.  B/l hearing equal to finger rub.  Symmetric palate elevation with mild dysphagia. 5/5 strength with b/l sternocleidomastoid & trapezius.  Midline tongue protrusion, with no atrophy or fasciculations.  Motor: Normal bulk & tone in all four extremities.  At least 3/5 strength throughout right upper and right lower extremities, both of which show downward drift and spasticity.  5/5 strength throughout left upper and left lower extremities, without downward drift and spasticity.  Sensory: Diminished to light touch and pinprick in right upper and right lower extremities.  Intact to light touch and pinprick in left upper and left lower extremities.  Reflex: 2+ and symmetric at b/l biceps, triceps, brachioradialis, patellae, and ankles.  Downgoing toes b/l.  Coordination: No dysmetria with b/l finger-to-nose and heel raise tests.  Gait and Romberg testing deferred due to right-sided weakness.      NIHSS Score:    LOC - 0  LOC Questions - 0  LOC Commands - 0  Gaze Preference - 0  Visual Fields - 0  Facial Palsy - 0  Motor Arm Left - 0  Motor Arm Right - 1  Motor Leg Left - 0  Motor Leg Right - 1  Limb Ataxia - 0  Sensory - 1  Language - 0  Speech - 0  Extinction - 0    NIHSS Score Total: 3    Modified [place] Scale: 2        Labs:    03-13    139  |  110<H>  |  10  ----------------------------<  94  3.6   |  20<L>  |  0.77    Ca    7.9<L>      13 Mar 2020 06:34  Phos  2.9     03-13  Mg     1.9     03-13    TPro  7.2  /  Alb  3.2<L>  /  TBili  0.3  /  DBili  x   /  AST  22  /  ALT  26  /  AlkPhos  86  03-13      Hemoglobin A1C, Whole Blood in AM (03.11.20 @ 09:40)    Hemoglobin A1C, Whole Blood: 5.7%    Lipid Profile in AM (03.11.20 @ 08:20)    Total Cholesterol/HDL Ratio Measurement: 2.8 RATIO    Cholesterol, Serum: 140 mg/dL    Triglycerides, Serum: 46 mg/dL    HDL Cholesterol, Serum: 50 mg/dL    Direct LDL: 81 mg/dL      Neuroimaging:    CT Head (3/10/20): Chronic left PCA territory infarct    CTA Head/Neck (3/11/20):  - Subacute/Chronic left thalamic infarct  - No intracranial or neck vessel abnormalities    Echo (3/11/20):  - LVEF > 55%  - Trace tricuspid, pulmonic, and aortic regurgitation  - No cardiac thrombus or intracardiac shunt identified      Assessment:  49 LHF with hypertensive headache and chronic left PCA territory infarct secondary to hypertension vs. cardioembolic source      Recommendations:    1. Stroke workup  - Q4H Neurochecks & Vital signs  - Continue Telemetry while inpatient    2. Secondary stroke prevention  - Aspirin 81mg daily  - Clopidogrel 75mg daily x 21 days  - Atorvastatin 40mg QHS  - Treat BP if over 120/80 (out of time window for permissive hypertension)       - Continue verapamil 80mg TID to treat hypertension and chronic daily headache  - Continue PT/OT with plan for inpatient rehabilitation upon discharge  - DVT ppx    3. Routine follow-up in Neurology / Stroke clinic      Please contact the Neurology consult service with any questions.    Yg Cyr MD   of Neurology  [facility] at [facility]

## 2020-03-13 NOTE — DISCHARGE NOTE PROVIDER - HOSPITAL COURSE
HPI:    49 y F from home walks independently with Mhx of HTN ( non compliant with meds), Sickle cell trait( never received any transfusion) and PSHx of heart surgery?( for some clot) presented to ED with Numbness, tingling and weakness of the Right side since yesterday. As per pt she was last seen normal 7am yesterday. Pt started having Numbness of the Right leg yesterday  which resolved after some time. Today pt woke up around 7 am with dizziness so went back to sleep. Pt then woke up at 10:30 am and noticed Numbness and tingling to the right side of face, arm and leg associated with weakness . Symptoms improve after 1 hr. Pt is having severe one sided headache every day for last 1 month. Pt is non compliant with her BP medication and donot check her BP at home     Pt denies any prior episode of numbness , h/o migraine, blurry vision, urinary or bladder incontinence ,seizures, CP, SOB, cough , Abd pain , nausea, vomiting or any urinary symptoms         ED course:     Vitals : Afebrile . /135  RR 20     Pt got hydralazine 10 mg IV x 1 , BP improved to 183/93     Labs : unremarkable     ECG : NSR     CTH : subacute to chronic L PCA infarct     Utox -ve         SH : Denies Smoking, alcohol or drug use (10 Mar 2020 19:19)            Pt p/w Right sided numbness,tingling and weakness. NIHSS increased overnight to 5, pt not a candidate for TPA. CTH : subacute to chronic L PCA infarct , no hemorrhage              Problem/Plan - 2:    ·  Problem: Uncontrolled hypertension.  Plan: Pt non compliant with her med    -started on amlodipine 5 mg     -monitor BP     -will started on verapamil 40mg tid as per neurology note after 24 hr window.             Problem/Plan - 3:    ·  Problem: Headache.  Plan: D/d : migraine vs uncontrolled HTN     - c/w pain control     - control BP. HPI:    49 y F from home walks independently with Mhx of HTN ( non compliant with meds), Sickle cell trait( never received any transfusion) and PSHx of heart surgery?( for some clot) presented to ED with Numbness, tingling and weakness of the Right side since yesterday. As per pt she was last seen normal 7am yesterday. Pt started having Numbness of the Right leg yesterday  which resolved after some time. Today pt woke up around 7 am with dizziness so went back to sleep. Pt then woke up at 10:30 am and noticed Numbness and tingling to the right side of face, arm and leg associated with weakness . Symptoms improve after 1 hr. Pt is having severe one sided headache every day for last 1 month. Pt is non compliant with her BP medication and donot check her BP at home     Pt denies any prior episode of numbness , h/o migraine, blurry vision, urinary or bladder incontinence ,seizures, CP, SOB, cough , Abd pain , nausea, vomiting or any urinary symptoms         ED course:     Vitals : Afebrile . /135  RR 20     Pt got hydralazine 10 mg IV x 1 , BP improved to 183/93     Labs : unremarkable     ECG : NSR     CTH : subacute to chronic L PCA infarct     Utox -ve         SH : Denies Smoking, alcohol or drug use (10 Mar 2020 19:19)        Stroke        Patient presented with right sided numbness, tingling, and weakness. Patient was not a candidate for tPA due to being outside of the window. CT Head showed subacute to chronic left PCA infarct with no hemorrhage. MRI was performed and confirmed the CT Head findings. EKG was performed and showed Normal Sinus Rhythm. Patient was placed on Aspirin, Atorvastatin, and Plavix. ECHO was performed and showed 55% EF with mitral and aortic regurgitation. CT Angio of the head and neck showed widely patient vasculature with no occlusion or stenosis, but did demonstrate a small lacunar infarct in the thalamus. Hemoglobin A1c was noted to be 5.7 with a normal lipid profile and Vitamin B12 in normal limits.         Dysphagia        Barium swallow was performed and showed food stuck at the C6-C7 level. GI was consulted and recommended no acute intervention. The patient was able to tolerate a regular diet prior to discharge.         Hypertension         Patient was known to have Hypertension and was found to be non-compliant with her medications. Patient was placed on Verapamil 80 mg q 8 hours with good effect in the BP control and to discontinue her chlorthalidone until additional BP control medications are needed.         Headache        Patient was noted to have a headache on admission and was provided Tylenol with good effect.         Vitamin D Deficiency        Patient was noted to have Vitamin D Deficiency and was placed on Ergocalciferol 50,000 units to be taken weekly for 8 weeks and to follow up with her Primary Care Provider for proper correction.         Patient was evaluated by PT and the decision was made for the patient to be discharged to [facility]. HPI:    49 y F from home walks independently with Mhx of HTN ( non compliant with meds), Sickle cell trait( never received any transfusion) and PSHx of heart surgery?( for some clot) presented to ED with Numbness, tingling and weakness of the Right side since yesterday. As per pt she was last seen normal 7am yesterday. Pt started having Numbness of the Right leg yesterday  which resolved after some time. Today pt woke up around 7 am with dizziness so went back to sleep. Pt then woke up at 10:30 am and noticed Numbness and tingling to the right side of face, arm and leg associated with weakness . Symptoms improve after 1 hr. Pt is having severe one sided headache every day for last 1 month. Pt is non compliant with her BP medication and donot check her BP at home     Pt denies any prior episode of numbness , h/o migraine, blurry vision, urinary or bladder incontinence ,seizures, CP, SOB, cough , Abd pain , nausea, vomiting or any urinary symptoms         ED course:     Vitals : Afebrile . /135  RR 20     Pt got hydralazine 10 mg IV x 1 , BP improved to 183/93     Labs : unremarkable     ECG : NSR     CTH : subacute to chronic L PCA infarct     Utox -ve         SH : Denies Smoking, alcohol or drug use (10 Mar 2020 19:19)        Stroke        Patient presented with right sided numbness, tingling, and weakness. Patient was not a candidate for tPA due to being outside of the window. CT Head showed subacute to chronic left PCA infarct with no hemorrhage. MRI was performed and confirmed the CT Head findings. EKG was performed and showed Normal Sinus Rhythm. Patient was placed on Aspirin, Atorvastatin, and Plavix. ECHO was performed and showed 55% EF with mitral and aortic regurgitation. CT Angio of the head and neck showed widely patient vasculature with no occlusion or stenosis, but did demonstrate a small lacunar infarct in the thalamus. Hemoglobin A1c was noted to be 5.7 with a normal lipid profile and Vitamin B12 in normal limits.         Dysphagia        Barium swallow was performed and showed food stuck at the C6-C7 level. GI was consulted and recommended no acute intervention. The patient was able to tolerate a regular diet prior to discharge.         Hypertension         Patient was known to have Hypertension and was found to be non-compliant with her medications. Patient was placed on Verapamil 80 mg q 8 hours with good effect in the BP control and to discontinue her chlorthalidone until additional BP control medications are needed.         Headache        Patient was noted to have a headache on admission and was provided Tylenol with good effect.         Vitamin D Deficiency        Patient was noted to have Vitamin D Deficiency and was placed on Ergocalciferol 50,000 units to be taken weekly for 8 weeks and to follow up with her Primary Care Provider for proper correction.         Patient was evaluated by PT and the decision was made for the patient to be discharged to rehab, pt will be able to tolerate 3 hours of rehab 7 days a week HPI:    49 y F from home walks independently with Mhx of HTN ( non compliant with meds), Sickle cell trait( never received any transfusion) and PSHx of heart surgery?( for some clot) presented to ED with Numbness, tingling and weakness of the Right side since yesterday. As per pt she was last seen normal 7am yesterday. Pt started having Numbness of the Right leg yesterday  which resolved after some time. Today pt woke up around 7 am with dizziness so went back to sleep. Pt then woke up at 10:30 am and noticed Numbness and tingling to the right side of face, arm and leg associated with weakness . Symptoms improve after 1 hr. Pt is having severe one sided headache every day for last 1 month. Pt is non compliant with her BP medication and donot check her BP at home     Pt denies any prior episode of numbness , h/o migraine, blurry vision, urinary or bladder incontinence ,seizures, CP, SOB, cough , Abd pain , nausea, vomiting or any urinary symptoms         ED course:     Vitals : Afebrile . /135  RR 20     Pt got hydralazine 10 mg IV x 1 , BP improved to 183/93     Labs : unremarkable     ECG : NSR     CTH : subacute to chronic L PCA infarct     Utox -ve         SH : Denies Smoking, alcohol or drug use (10 Mar 2020 19:19)        Stroke        Patient presented with right sided numbness, tingling, and weakness. Patient was not a candidate for tPA due to being outside of the window. CT Head showed subacute to chronic left PCA infarct with no hemorrhage. MRI was performed and confirmed the CT Head findings. EKG was performed and showed Normal Sinus Rhythm. Patient was placed on Aspirin, Atorvastatin, and Plavix. ECHO was performed and showed 55% EF with mitral and aortic regurgitation. CT Angio of the head and neck showed widely patient vasculature with no occlusion or stenosis, but did demonstrate a small lacunar infarct in the thalamus. Hemoglobin A1c was noted to be 5.7 with a normal lipid profile and Vitamin B12 in normal limits.         Dysphagia        Barium swallow was performed and showed food stuck at the C6-C7 level. GI was consulted and recommended no acute intervention. The patient was able to tolerate a regular diet prior to discharge.         Hypertension         Patient was known to have Hypertension and was found to be non-compliant with her medications. Patient was placed on Verapamil 80 mg q 8 hours with good effect in the BP control and to discontinue her chlorthalidone until additional BP control medications are needed. Pt to follow up about stress test as outpatient in 4-6 weeks         Headache        Patient was noted to have a headache on admission and was provided Tylenol with good effect.         Vitamin D Deficiency        Patient was noted to have Vitamin D Deficiency and was placed on Ergocalciferol 50,000 units to be taken weekly for 8 weeks and to follow up with her Primary Care Provider for proper correction.         Patient was evaluated by PT and the decision was made for the patient to be discharged to rehab, pt will be able to tolerate 3 hours of rehab 7 days a week

## 2020-03-13 NOTE — SWALLOW VFSS/MBS ASSESSMENT ADULT - SLP GENERAL OBSERVATIONS
PO trials of Mechanical Soft, Puree, Honey & nectar thick and thin liquids administered. Pt seen for modified barium swallow study to objectively assess swallow function; r/o aspiration. Study conducted with Radiologist, Dr. Patel. Pt seated in imaging chair in left lateral view for videofluoroscopy. PO trials of Mechanical Soft, Puree, Honey & nectar thick and thin liquids administered.

## 2020-03-13 NOTE — DISCHARGE NOTE PROVIDER - PROVIDER TOKENS
PROVIDER:[TOKEN:[[id number]],FOLLOWUP:[2 weeks]],PROVIDER:[TOKEN:[[id number]],FOLLOWUP:[2 weeks]],PROVIDER:[TOKEN:[[id number]],FOLLOWUP:[2 weeks]],PROVIDER:[TOKEN:[[id number]],FOLLOWUP:[2 weeks]]

## 2020-03-13 NOTE — PROGRESS NOTE ADULT - SUBJECTIVE AND OBJECTIVE BOX
PGY1 Note discussed with Supervising Resident and Primary Attending.    Patient is a 49y old  Female who presents with a chief complaint of R sided Numbness and weakness (13 Mar 2020 08:22)      INTERVAL HPI/OVERNIGHT EVENTS :    ***********************************************************************************************************    MEDICATIONS  (STANDING):  aspirin  chewable 81 milliGRAM(s) Oral daily  atorvastatin 80 milliGRAM(s) Oral at bedtime  clopidogrel Tablet 75 milliGRAM(s) Oral daily  enoxaparin Injectable 40 milliGRAM(s) SubCutaneous daily  ergocalciferol 07225 Unit(s) Oral <User Schedule>  influenza   Vaccine 0.5 milliLiter(s) IntraMuscular once  verapamil 80 milliGRAM(s) Oral every 8 hours    MEDICATIONS  (PRN):  acetaminophen   Tablet .. 650 milliGRAM(s) Oral every 6 hours PRN Temp greater or equal to 38C (100.4F), Mild Pain (1 - 3)  ketorolac   Injectable 30 milliGRAM(s) IV Push every 6 hours PRN Severe Pain (7 - 10)  traMADol 50 milliGRAM(s) Oral every 8 hours PRN Moderate Pain (4 - 6)      ***********************************************************************************************************    Allergies    No Known Allergies    Intolerances        ***********************************************************************************************************    REVIEW OF SYSTEMS :  * CONSTITUTIONAL      : No Fever, Weight loss, or Fatigue  * EYES                             : No eye pain , Visual disturbances or Discharge  * RESPIRATORY             : No Cough, Wheezing, Chills or Hemoptysis; No shortness of breath  * CARDIOVASCULAR     : No Chest pain, Palpitations, Dizziness, or Leg swelling  * GASTROINTESTINAL  : No Abdominal or Epigastric pain. No Nausea, Vomiting or Hematemesis; No Diarrhea or Constipation. No Melena or Hematochezia.  * GENITOURINARY        : No Dysuria , Frequency , Haematuria   * NEUROLOGICAL          : No Headaches, Memory loss, Loss of strength, Numbness, or Tremors  * MUSCULOSKELETAL   : No Joint pain  * PSYCHIATRY                 : No Depression or Anxiety   * HEME/LYMPH              : No Easy Bruising or Bleeding gums  * SKIN                               : No Itching, Burning, Rashes, or Lesions     ***********************************************************************************************************    Vital Signs Last 24 Hrs  T(C): 36.5 (13 Mar 2020 07:35), Max: 37.1 (12 Mar 2020 23:45)  T(F): 97.7 (13 Mar 2020 07:35), Max: 98.7 (12 Mar 2020 23:45)  HR: 92 (13 Mar 2020 07:35) (84 - 98)  BP: 124/71 (13 Mar 2020 07:35) (114/76 - 150/104)  BP(mean): --  RR: 18 (13 Mar 2020 07:35) (16 - 18)  SpO2: 99% (13 Mar 2020 07:35) (99% - 100%)    ***********************************************************************************************************    PHYSICAL EXAM :  * GENERAL                 : NAD, Well-groomed, Well-developed  * HEAD                       :  Atraumatic, Normocephalic  * EYES                         : EOMI, PERRLA, Conjunctiva and Sclera clear  * ENT                           : Moist Mucous Membranes  * NECK                         : Supple, No JVD, Normal Thyroid  * CHEST/LUNG           : Clear to Auscultation bilaterally; No Rales, Rhonchi, Wheezing or Rubs  * HEART                       : Regular Rate and Rhythm; No murmurs, Rubs or gallops  * ABDOMEN                : Soft, Non-tender, Non-distended; Bowel Sounds present  * NERVOUS SYSTEM  :  Alert & Oriented X3, Good Concentration; Motor Strength 5/5 B/L UL LL ; DTRs 2+ Intact and Symmetric  * EXTREMITIES            :  2+ Peripheral Pulses, No clubbing, cyanosis, or edema  * SKIN                           : No Rashes or Lesions    **********************************************************************************************************  LABS:                          12.3   7.16  )-----------( 307      ( 13 Mar 2020 06:34 )             38.1     03-13    139  |  110<H>  |  10  ----------------------------<  94  3.6   |  20<L>  |  0.77    Ca    7.9<L>      13 Mar 2020 06:34  Phos  2.9     03-13  Mg     1.9     03-13    TPro  7.2  /  Alb  3.2<L>  /  TBili  0.3  /  DBili  x   /  AST  22  /  ALT  26  /  AlkPhos  86  03-13        CAPILLARY BLOOD GLUCOSE          **********************************************************************************************************    RADIOLOGY & ADDITIONAL TESTS:   No radiological imaging was required    Imaging Personally Reviewed   :  [ ] YES  [ ] NO    Consultant(s) Notes Reviewed :  [ ] YES  [ ] NO PGY1 Note discussed with Supervising Resident and Primary Attending.    Patient is a 49y old  Female who presents with a chief complaint of R sided Numbness and weakness (13 Mar 2020 08:22)      INTERVAL HPI/OVERNIGHT EVENTS :    No acute event overnight reported by overnight team and nurses.   Pt remained hemodynamically stable.  Pt seen and examined  at bed side. All concerns and questions answered.   Report no new complaint. Pt denies any fever, nausea, vomiting.  Pt has sever right sided weakness  MEI recommended by pt  pt has dysphagia so will get barium swalllow    ***********************************************************************************************************    MEDICATIONS  (STANDING):  aspirin  chewable 81 milliGRAM(s) Oral daily  atorvastatin 80 milliGRAM(s) Oral at bedtime  clopidogrel Tablet 75 milliGRAM(s) Oral daily  enoxaparin Injectable 40 milliGRAM(s) SubCutaneous daily  ergocalciferol 17328 Unit(s) Oral <User Schedule>  influenza   Vaccine 0.5 milliLiter(s) IntraMuscular once  verapamil 80 milliGRAM(s) Oral every 8 hours    MEDICATIONS  (PRN):  acetaminophen   Tablet .. 650 milliGRAM(s) Oral every 6 hours PRN Temp greater or equal to 38C (100.4F), Mild Pain (1 - 3)  ketorolac   Injectable 30 milliGRAM(s) IV Push every 6 hours PRN Severe Pain (7 - 10)  traMADol 50 milliGRAM(s) Oral every 8 hours PRN Moderate Pain (4 - 6)      ***********************************************************************************************************    Allergies    No Known Allergies    Intolerances        ***********************************************************************************************************    REVIEW OF SYSTEMS :  * CONSTITUTIONAL      : No Fever,   * EYES                             : No eye pain ,  * RESPIRATORY             : No Cough,  * CARDIOVASCULAR     : No Chest pain,  * GASTROINTESTINAL  : No Abdominal or Epigastric pain. No Nausea, Vomiting  * GENITOURINARY        : No Dysuria ,  * NEUROLOGICAL          : No Headaches, pt has weakness on rt side   * MUSCULOSKELETAL   : No Joint pain, weakness on right side ,dysphagia diet  * PSYCHIATRY                 : No Depression   * HEME/LYMPH              : No Easy Bruising  * SKIN                               : No Itching, Burning,   ***********************************************************************************************************    Vital Signs Last 24 Hrs  T(C): 36.5 (13 Mar 2020 07:35), Max: 37.1 (12 Mar 2020 23:45)  T(F): 97.7 (13 Mar 2020 07:35), Max: 98.7 (12 Mar 2020 23:45)  HR: 92 (13 Mar 2020 07:35) (84 - 98)  BP: 124/71 (13 Mar 2020 07:35) (114/76 - 150/104)  BP(mean): --  RR: 18 (13 Mar 2020 07:35) (16 - 18)  SpO2: 99% (13 Mar 2020 07:35) (99% - 100%)    ***********************************************************************************************************    PHYSICAL EXAM :  * GENERAL                 : NAD, Well-groomed, Well-developed  * HEAD                       :  Atraumatic, Normocephalic  * EYES                         :  Conjunctiva and Sclera clear  * ENT                           : Moist Mucous Membranes  * NECK                         : Supple,   * CHEST/LUNG           : Clear to Auscultation bilaterally; No Rales,  * HEART                       :  No murmurs, Rubs or gallops  * ABDOMEN                : Soft, Non-tender, Non-distended; Bowel Sounds present  * NERVOUS SYSTEM  :  Alert & Oriented X3, Good Concentration; Motor Strength 2/5 om right sides both upper and lower extremity, right sided cerebellar signs appearing ;  * EXTREMITIES            :  2+ Peripheral Pulses, No clubbing,  * SKIN                           : No Rashes     **********************************************************************************************************  LABS:                          12.3   7.16  )-----------( 307      ( 13 Mar 2020 06:34 )             38.1     03-13    139  |  110<H>  |  10  ----------------------------<  94  3.6   |  20<L>  |  0.77    Ca    7.9<L>      13 Mar 2020 06:34  Phos  2.9     03-13  Mg     1.9     03-13    TPro  7.2  /  Alb  3.2<L>  /  TBili  0.3  /  DBili  x   /  AST  22  /  ALT  26  /  AlkPhos  86  03-13        CAPILLARY BLOOD GLUCOSE          **********************************************************************************************************    RADIOLOGY & ADDITIONAL TESTS:   No radiological imaging was required    Imaging Personally Reviewed   :  [ x] YES  [ ] NO    Consultant(s) Notes Reviewed :  [x ] YES  [ ] NO

## 2020-03-13 NOTE — DISCHARGE NOTE PROVIDER - NSDCMRMEDTOKEN_GEN_ALL_CORE_FT
chlorthalidone 25 mg oral tablet: 1 tab(s) orally once a day acetaminophen 325 mg oral tablet: 2 tab(s) orally every 6 hours, As needed, Temp greater or equal to 38C (100.4F), Mild Pain (1 - 3)  aspirin 81 mg oral tablet, chewable: 1 tab(s) orally once a day  atorvastatin 80 mg oral tablet: 1 tab(s) orally once a day (at bedtime)  clopidogrel 75 mg oral tablet: 1 tab(s) orally once a day  melatonin 3 mg oral tablet: 1 tab(s) orally once a day (at bedtime), As needed, Insomnia  verapamil 80 mg oral tablet: 1 tab(s) orally every 8 hours  Vitamin D2 50,000 intl units (1.25 mg) oral capsule: 1 cap(s) orally once a week

## 2020-03-13 NOTE — PROGRESS NOTE ADULT - PROBLEM SELECTOR PLAN 1
p/w Right sided numbness, tingling and weakness   - NIHSS increased overnight to 5, pt not a candidate for TPA  - CTH : subacute to chronic L PCA infarct , no hemorrhage   - ECG : NSR   - S/S EVal  - started on Asp 81 mg and high intensity statins    - cont tele  - f/u Transthoracic echo with bubble study  -f/u ct angio head and neck showed =Widely patent head and neck vasculature. No significant stenosis, occlusion, or dissection. Initial noncontrast images suggest a small lacunar infarct in the left thalamus.  -  A1c 5.7, Lipid profile wnl ,   Vit B12 wnl ,   - f/u PT eval ,   -official SnS   -neuro checks q4  - Neuro consult Dr Cyr  pt on asprin, clopidogrel, statin p/w Right sided numbness, tingling and weakness   - NIHSS increased since admission, pt not a candidate for TPA   - CTH : subacute to chronic L PCA infarct , no hemorrhage   - ECG : NSR   - S/S EVal  - started on Asp 81 mg and high intensity statins    - cont tele  -  Transthoracic echo with bubble study= EF>55, Mild mitral regurgitation. Trace aortic regurgitation.  -f/u ct angio head and neck showed =Widely patent head and neck vasculature. No significant stenosis, occlusion, or dissection. Initial noncontrast images suggest a small lacunar infarct in the left thalamus.  -  A1c 5.7, Lipid profile wnl ,   Vit B12 wnl ,   -  PT eval SUGGEST MEI,   -official SnS   -neuro checks q4  - Neuro consult Dr Cyr  pt on asprin, clopidogrel, statin p/w Right sided numbness, tingling and weakness   - NIHSS increased since admission, pt not a candidate for TPA   - CTH : subacute to chronic L PCA infarct , no hemorrhage   - ECG : NSR   - S/S EVal  - started on Asp 81 mg and high intensity statins    - cont tele  -  Transthoracic echo with bubble study= EF>55, Mild mitral regurgitation. Trace aortic regurgitation.  -f/u ct angio head and neck showed =Widely patent head and neck vasculature. No significant stenosis, occlusion, or dissection. Initial noncontrast images suggest a small lacunar infarct in the left thalamus.  -  A1c 5.7, Lipid profile wnl ,   Vit B12 wnl ,   -  PT eval SUGGEST MEI,   -official SnS suggested barium swallow which showed food stcking at c6,c7 level so will need EGD FOR futhur eval. Dr Fisher consulted  -neuro checks q4  - Neuro consult Dr Cyr  pt on asprin, clopidogrel, statin

## 2020-03-13 NOTE — DISCHARGE NOTE PROVIDER - NSDCCPCAREPLAN_GEN_ALL_CORE_FT
PRINCIPAL DISCHARGE DIAGNOSIS  Diagnosis: Cerebrovascular accident (CVA), unspecified mechanism  Assessment and Plan of Treatment: Lamonte came in with numbness,tingling,weakness of right side and was found to have stroke of posterior circulation. You were not in TPA WINDOW so you couldnot recieve any TPA. Over yuor stay your symptoms got worst and you were monitored with neurochecks. You were on dysphagia diet. Barrium swallow showed food sticking at c6,7 Levels which was furthur investigated by gastroentrologist EGD WHICH SHOWED.................      SECONDARY DISCHARGE DIAGNOSES  Diagnosis: Dysphagia  Assessment and Plan of Treatment: You came in with stroke of post circularion. You had dysphagia and were on dysphagia diet. Barrium swallow showed food sticking at c6,7 Levels which was furthur investigated by gastroentrologist EGD WHICH SHOWED.................    Diagnosis: Uncontrolled hypertension  Assessment and Plan of Treatment: You came in with uncontrolled hypertention and was started on valsartan 80mg please continue your medication as per medicine reconciliation and followup with your PCP antonio 1 week of discharge. PRINCIPAL DISCHARGE DIAGNOSIS  Diagnosis: Cerebrovascular accident (CVA), unspecified mechanism  Assessment and Plan of Treatment: Patient presented with right sided numbness, tingling, and weakness. Patient was not a candidate for tPA due to being outside of the window. CT Head showed subacute to chronic left PCA infarct with no hemorrhage. MRI was performed and confirmed the CT Head findings. EKG was performed and showed Normal Sinus Rhythm. Patient was placed on Aspirin, Atorvastatin, and Plavix. ECHO was performed and showed 55% EF with mitral and aortic regurgitation. CT Angio of the head and neck showed widely patient vasculature with no occlusion or stenosis, but did demonstrate a small lacunar infarct in the thalamus. Hemoglobin A1c was noted to be 5.7 with a normal lipid profile and Vitamin B12 in normal limits.      SECONDARY DISCHARGE DIAGNOSES  Diagnosis: Vitamin D deficiency  Assessment and Plan of Treatment: Patient was noted to have Vitamin D Deficiency and was placed on Ergocalciferol 50,000 units to be taken weekly for 8 weeks and to follow up with her Primary Care Provider for proper correction.    Diagnosis: Dysphagia  Assessment and Plan of Treatment: Barium swallow was performed and showed food stuck at the C6-C7 level. GI was consulted and recommended no acute intervention. The patient was able to tolerate a regular diet prior to discharge.    Diagnosis: Uncontrolled hypertension  Assessment and Plan of Treatment: Patient was known to have Hypertension and was found to be non-compliant with her medications. Patient was placed on Verapamil 80 mg q 8 hours with good effect in the BP control and to discontinue her chlorthalidone until additional BP control medications are needed. PRINCIPAL DISCHARGE DIAGNOSIS  Diagnosis: Cerebrovascular accident (CVA), unspecified mechanism  Assessment and Plan of Treatment: Patient presented with right sided numbness, tingling, and weakness. Patient was not a candidate for tPA due to being outside of the window. CT Head showed subacute to chronic left PCA infarct with no hemorrhage. MRI was performed and confirmed the CT Head findings. EKG was performed and showed Normal Sinus Rhythm. Patient was placed on Aspirin, Atorvastatin, and Plavix. ECHO was performed and showed 55% EF with mitral and aortic regurgitation. CT Angio of the head and neck showed widely patient vasculature with no occlusion or stenosis, but did demonstrate a small lacunar infarct in the thalamus. Hemoglobin A1c was noted to be 5.7 with a normal lipid profile and Vitamin B12 in normal limits. You will be able to tolerate 3 hours of rehab 7 days a week      SECONDARY DISCHARGE DIAGNOSES  Diagnosis: Vitamin D deficiency  Assessment and Plan of Treatment: Patient was noted to have Vitamin D Deficiency and was placed on Ergocalciferol 50,000 units to be taken weekly for 8 weeks and to follow up with her Primary Care Provider for proper correction.    Diagnosis: Dysphagia  Assessment and Plan of Treatment: Barium swallow was performed and showed food stuck at the C6-C7 level. GI was consulted and recommended no acute intervention. The patient was able to tolerate a regular diet prior to discharge.    Diagnosis: Uncontrolled hypertension  Assessment and Plan of Treatment: Patient was known to have Hypertension and was found to be non-compliant with her medications. Patient was placed on Verapamil 80 mg q 8 hours with good effect in the BP control and to discontinue her chlorthalidone until additional BP control medications are needed.

## 2020-03-13 NOTE — SWALLOW VFSS/MBS ASSESSMENT ADULT - SLP PERTINENT HISTORY OF CURRENT PROBLEM
Pt seen for modified barium swallow study to objectively assess swallow function; r/o aspiration. Study conducted with Radiologist, Dr. Patel. Pt seated in imaging chair in left lateral view for videofluoroscopy. 49 y F from home walks independently with Mhx of HTN ( non compliant with meds), Sickle cell trait( never received any transfusion) and PSHx of heart surgery?( for some clot) presented to ED with Numbness, tingling and weakness of the Right side. Admitted to  for Tele.

## 2020-03-13 NOTE — SWALLOW VFSS/MBS ASSESSMENT ADULT - RECOMMENDED FEEDING/EATING TECHNIQUES
position upright (90 degrees)/crush medication (when feasible)/tuck chin/allow for swallow between intakes/Chin Tuck during swallow, to narrow the pharynx and increase airway protection./alternate food with liquid/oral hygiene/turn head left

## 2020-03-13 NOTE — PROGRESS NOTE ADULT - PROBLEM SELECTOR PLAN 2
Pt non compliant with her med  -started on amlodipine 5 mg , verapamil 40 tid  -monitor BP Pt non compliant with her med  -started on  verapamil 80 tid  -monitor BP  -CARDIOLOGSIT Dr. Jean

## 2020-03-13 NOTE — PROGRESS NOTE ADULT - ATTENDING COMMENTS
I counseled the patient about his diagnoses of stroke and chronic daily headache, and the treatment to continue to prevent their recurrence.
I counseled the patient about the medications to take to minimize the risk of stroke in the future.
I counseled the patient about the medications to continue to help prevent recurrence of headaches and minimize the risk for recurrent stroke.

## 2020-03-13 NOTE — DISCHARGE NOTE PROVIDER - CARE PROVIDER_API CALL
Darío Benson)  Clinical Neurophysiology; Neurology  [address], [address] [place] [address]  Phone: [phone]  Fax: [phone]  Follow Up Time: 2 weeks    Chary Jean)  Internal Medicine  [address] [place] [address]  Phone: [phone]  Fax: [phone]  Follow Up Time: 2 weeks    Scout Yo)  Internal Medicine  [address] [facility], [place] [address]  Phone: [phone]  Fax: [phone]  Follow Up Time: 2 weeks    Cosme Jones)  Gastroenterology; Internal Medicine  [address] [place] [address]  Phone: [phone]  Fax: [phone]  Follow Up Time: 2 weeks

## 2020-03-13 NOTE — PROGRESS NOTE ADULT - SUBJECTIVE AND OBJECTIVE BOX
CHIEF COMPLAINT:Patient is a 49y old  Female who presents with a chief complaint of R sided Numbness and weakness.Pt appears comfortable.    	  REVIEW OF SYSTEMS:  CONSTITUTIONAL: No fever, weight loss, or fatigue  EYES: No eye pain, visual disturbances, or discharge  ENT:  No difficulty hearing, tinnitus, vertigo; No sinus or throat pain  NECK: No pain or stiffness  RESPIRATORY: No cough, wheezing, chills or hemoptysis; No Shortness of Breath  CARDIOVASCULAR: No chest pain, palpitations, passing out, dizziness, or leg swelling  GASTROINTESTINAL: No abdominal or epigastric pain. No nausea, vomiting, or hematemesis; No diarrhea or constipation. No melena or hematochezia.  GENITOURINARY: No dysuria, frequency, hematuria, or incontinence  NEUROLOGICAL: No headaches, memory loss, loss of strength, numbness, or tremors  SKIN: No itching, burning, rashes, or lesions   LYMPH Nodes: No enlarged glands  ENDOCRINE: No heat or cold intolerance; No hair loss  MUSCULOSKELETAL: No joint pain or swelling; No muscle, back, or extremity pain  PSYCHIATRIC: No depression, anxiety, mood swings, or difficulty sleeping  HEME/LYMPH: No easy bruising, or bleeding gums  ALLERGY AND IMMUNOLOGIC: No hives or eczema	      PHYSICAL EXAM:  T(C): 36.5 (03-13-20 @ 07:35), Max: 37.1 (03-12-20 @ 23:45)  HR: 92 (03-13-20 @ 07:35) (84 - 98)  BP: 124/71 (03-13-20 @ 07:35) (114/76 - 150/104)  RR: 18 (03-13-20 @ 07:35) (16 - 18)  SpO2: 99% (03-13-20 @ 07:35) (99% - 100%)  Wt(kg): --  I&O's Summary      Appearance: Normal	  HEENT:   Normal oral mucosa, PERRL, EOMI	  Lymphatic: No lymphadenopathy  Cardiovascular: Normal S1 S2, No JVD, No murmurs, No edema  Respiratory: Lungs clear to auscultation	  Psychiatry: A & O x 3, Mood & affect appropriate  Gastrointestinal:  Soft, Non-tender, + BS	  Skin: No rashes, No ecchymoses, No cyanosis	  Neurologic: Non-focal  Extremities: Normal range of motion, No clubbing, cyanosis or edema  Vascular: Peripheral pulses palpable 2+ bilaterally    MEDICATIONS  (STANDING):  aspirin  chewable 81 milliGRAM(s) Oral daily  atorvastatin 80 milliGRAM(s) Oral at bedtime  clopidogrel Tablet 75 milliGRAM(s) Oral daily  enoxaparin Injectable 40 milliGRAM(s) SubCutaneous daily  ergocalciferol 80764 Unit(s) Oral <User Schedule>  influenza   Vaccine 0.5 milliLiter(s) IntraMuscular once  verapamil 80 milliGRAM(s) Oral every 8 hours    Tele-NSR,sinus tach    	  LABS:	 	                          12.3   7.16  )-----------( 307      ( 13 Mar 2020 06:34 )             38.1     03-13    139  |  110<H>  |  10  ----------------------------<  94  3.6   |  20<L>  |  0.77    Ca    7.9<L>      13 Mar 2020 06:34  Phos  2.9     03-13  Mg     1.9     03-13    TPro  7.2  /  Alb  3.2<L>  /  TBili  0.3  /  DBili  x   /  AST  22  /  ALT  26  /  AlkPhos  86  03-13      Lipid Profile: Cholesterol 140  LDL 81  HDL 50  TG 46    HgA1c: Hemoglobin A1C, Whole Blood: 5.7 % (03-11 @ 09:40)    TSH: Thyroid Stimulating Hormone, Serum: 0.56 uU/mL (03-11 @ 08:20)

## 2020-03-13 NOTE — SWALLOW VFSS/MBS ASSESSMENT ADULT - PHARYNGEAL PHASE COMMENTS
PHARYNGEAL PHASE: Mild delayed of swallow reflex noted, with reduced laryngeal elevation seen. Sufficient epiglottic tilt observed on initial swallow. However, pooling at vallecular level from oral residue (post-swallow) on all consistencies, subsequently spilling to pyriform sinuses, requiring repeat cues for volitional swallow. Poor pharyngeal contractility was evident. Cues for Chin tuck appears to increase pharyngeal clearance during each swallow when executed. Effortful swallow was attempted, appeared to improve function. No laryngeal penetration or aspiration seen on any trials.

## 2020-03-13 NOTE — DISCHARGE NOTE PROVIDER - CARE PROVIDERS DIRECT ADDRESSES
[email or web address]

## 2020-03-13 NOTE — SWALLOW VFSS/MBS ASSESSMENT ADULT - ORAL PHASE COMMENTS
ORAL PHASE: Adequate labial seal achieved with good bolus containment. Reduced lingual strength/ROM/agility, reduced BOT to posterior pharyngeal wall contact, discoordinated lingual movement resulting in impaired bolus formation and slow A-P transport. Good velopharyngeal movement and closure. Reduced oral transit, with mild oral residue post swallow across all trials, requiring 2-3 swallows to clear. Mild premature spillage over base of tongue to the hypopharynx, thin > thick liquids & puree.

## 2020-03-13 NOTE — SWALLOW VFSS/MBS ASSESSMENT ADULT - DIAGNOSTIC IMPRESSIONS
Pt p/w oropharyngeal dysphagia: prolonged bolus formation, Increased mastication time, reduced A-P transport, oral stasis, premature spillage of bolus to the pharynx, delayed pharyngeal transit. Notable reduced esophageal motility of bolus 2/2 possible narrowing vs osteophyte ~C6-C7.

## 2020-03-13 NOTE — PROGRESS NOTE ADULT - SUBJECTIVE AND OBJECTIVE BOX
HPI:  49 y F from home walks independently with Mhx of HTN ( non compliant with meds), Sickle cell trait( never received any transfusion) and PSHx of heart surgery?( for some clot) presented to ED with Numbness, tingling and weakness of the Right side since yesterday. As per pt she was last seen normal 7am yesterday. Pt started having Numbness of the Right leg yesterday  which resolved after some time. Today pt woke up around 7 am with dizziness so went back to sleep. Pt then woke up at 10:30 am and noticed Numbness and tingling to the right side of face, arm and leg associated with weakness . Symptoms improve after 1 hr. Pt is having severe one sided headache every day for last 1 month. Pt is non compliant with her BP medication and donot check her BP at home   Pt denies any prior episode of numbness , h/o migraine, blurry vision, urinary or bladder incontinence ,seizures, CP, SOB, cough , Abd pain , nausea, vomiting or any urinary symptoms     ED course:   Vitals : Afebrile . 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   Utox -ve     SH : Denies Smoking, alcohol or drug use (10 Mar 2020 19:19)      Patient is a 49y old  Female who presents with a chief complaint of R sided Numbness and weakness (13 Mar 2020 15:00)      INTERVAL HPI/OVERNIGHT EVENTS:  T(C): 36.7 (03-13-20 @ 16:08), Max: 37.1 (03-12-20 @ 23:45)  HR: 76 (03-13-20 @ 16:08) (76 - 98)  BP: 138/89 (03-13-20 @ 16:08) (123/70 - 150/104)  RR: 18 (03-13-20 @ 16:08) (18 - 18)  SpO2: 99% (03-13-20 @ 16:08) (99% - 100%)  Wt(kg): --  I&O's Summary      REVIEW OF SYSTEMS: denies fever, chills, SOB, palpitations, chest pain, abdominal pain, nausea, vomitting, diarrhea, constipation, dizziness    MEDICATIONS  (STANDING):  aspirin  chewable 81 milliGRAM(s) Oral daily  atorvastatin 80 milliGRAM(s) Oral at bedtime  clopidogrel Tablet 75 milliGRAM(s) Oral daily  enoxaparin Injectable 40 milliGRAM(s) SubCutaneous daily  ergocalciferol 80006 Unit(s) Oral <User Schedule>  influenza   Vaccine 0.5 milliLiter(s) IntraMuscular once  verapamil 80 milliGRAM(s) Oral every 8 hours    MEDICATIONS  (PRN):  acetaminophen   Tablet .. 650 milliGRAM(s) Oral every 6 hours PRN Temp greater or equal to 38C (100.4F), Mild Pain (1 - 3)  ketorolac   Injectable 30 milliGRAM(s) IV Push every 6 hours PRN Severe Pain (7 - 10)  traMADol 50 milliGRAM(s) Oral every 8 hours PRN Moderate Pain (4 - 6)      PHYSICAL EXAM:  GENERAL: NAD, well-groomed, well-developed  HEAD:  Atraumatic, Normocephalic  EYES: EOMI, PERRLA, conjunctiva and sclera clear  ENMT: No tonsillar erythema, exudates, or enlargement; Moist mucous membranes, Good dentition, No lesions  NECK: Supple, No JVD, Normal thyroid  NERVOUS SYSTEM:  Alert & Oriented X3, Good concentration; Motor Strength 5/5 B/L upper and lower extremities; DTRs 2+ intact and symmetric  CHEST/LUNG: Clear to percussion bilaterally; No rales, rhonchi, wheezing, or rubs  HEART: Regular rate and rhythm; No murmurs, rubs, or gallops  ABDOMEN: Soft, Nontender, Nondistended; Bowel sounds present  EXTREMITIES:  2+ Peripheral Pulses, No clubbing, cyanosis, or edema  LYMPH: No lymphadenopathy noted  SKIN: No rashes or lesions  LABS:                        12.3   7.16  )-----------( 307      ( 13 Mar 2020 06:34 )             38.1     03-13    139  |  110<H>  |  10  ----------------------------<  94  3.6   |  20<L>  |  0.77    Ca    7.9<L>      13 Mar 2020 06:34  Phos  2.9     03-13  Mg     1.9     03-13    TPro  7.2  /  Alb  3.2<L>  /  TBili  0.3  /  DBili  x   /  AST  22  /  ALT  26  /  AlkPhos  86  03-13        CAPILLARY BLOOD GLUCOSE

## 2020-03-14 LAB
ALBUMIN SERPL ELPH-MCNC: 3 G/DL — LOW (ref 3.5–5)
ALP SERPL-CCNC: 79 U/L — SIGNIFICANT CHANGE UP (ref 40–120)
ALT FLD-CCNC: 29 U/L DA — SIGNIFICANT CHANGE UP (ref 10–60)
ANION GAP SERPL CALC-SCNC: 8 MMOL/L — SIGNIFICANT CHANGE UP (ref 5–17)
ANISOCYTOSIS BLD QL: SLIGHT — SIGNIFICANT CHANGE UP
AST SERPL-CCNC: 28 U/L — SIGNIFICANT CHANGE UP (ref 10–40)
BASOPHILS # BLD AUTO: 0.01 K/UL — SIGNIFICANT CHANGE UP (ref 0–0.2)
BASOPHILS NFR BLD AUTO: 0.2 % — SIGNIFICANT CHANGE UP (ref 0–2)
BILIRUB SERPL-MCNC: 0.4 MG/DL — SIGNIFICANT CHANGE UP (ref 0.2–1.2)
BUN SERPL-MCNC: 7 MG/DL — SIGNIFICANT CHANGE UP (ref 7–18)
CALCIUM SERPL-MCNC: 8.3 MG/DL — LOW (ref 8.4–10.5)
CHLORIDE SERPL-SCNC: 106 MMOL/L — SIGNIFICANT CHANGE UP (ref 96–108)
CO2 SERPL-SCNC: 24 MMOL/L — SIGNIFICANT CHANGE UP (ref 22–31)
CREAT SERPL-MCNC: 0.74 MG/DL — SIGNIFICANT CHANGE UP (ref 0.5–1.3)
EOSINOPHIL # BLD AUTO: 0.07 K/UL — SIGNIFICANT CHANGE UP (ref 0–0.5)
EOSINOPHIL NFR BLD AUTO: 1.7 % — SIGNIFICANT CHANGE UP (ref 0–6)
GLUCOSE SERPL-MCNC: 84 MG/DL — SIGNIFICANT CHANGE UP (ref 70–99)
HCT VFR BLD CALC: 35.2 % — SIGNIFICANT CHANGE UP (ref 34.5–45)
HGB BLD-MCNC: 11.6 G/DL — SIGNIFICANT CHANGE UP (ref 11.5–15.5)
HYPOCHROMIA BLD QL: SLIGHT — SIGNIFICANT CHANGE UP
IMM GRANULOCYTES NFR BLD AUTO: 0.2 % — SIGNIFICANT CHANGE UP (ref 0–1.5)
LYMPHOCYTES # BLD AUTO: 1.46 K/UL — SIGNIFICANT CHANGE UP (ref 1–3.3)
LYMPHOCYTES # BLD AUTO: 35.9 % — SIGNIFICANT CHANGE UP (ref 13–44)
MACROCYTES BLD QL: SLIGHT — SIGNIFICANT CHANGE UP
MAGNESIUM SERPL-MCNC: 1.9 MG/DL — SIGNIFICANT CHANGE UP (ref 1.6–2.6)
MANUAL SMEAR VERIFICATION: SIGNIFICANT CHANGE UP
MCHC RBC-ENTMCNC: 22.5 PG — LOW (ref 27–34)
MCHC RBC-ENTMCNC: 33 GM/DL — SIGNIFICANT CHANGE UP (ref 32–36)
MCV RBC AUTO: 68.3 FL — LOW (ref 80–100)
MONOCYTES # BLD AUTO: 0.56 K/UL — SIGNIFICANT CHANGE UP (ref 0–0.9)
MONOCYTES NFR BLD AUTO: 13.8 % — SIGNIFICANT CHANGE UP (ref 2–14)
NEUTROPHILS # BLD AUTO: 1.96 K/UL — SIGNIFICANT CHANGE UP (ref 1.8–7.4)
NEUTROPHILS NFR BLD AUTO: 48.2 % — SIGNIFICANT CHANGE UP (ref 43–77)
NRBC # BLD: 0 /100 WBCS — SIGNIFICANT CHANGE UP (ref 0–0)
OVALOCYTES BLD QL SMEAR: SLIGHT — SIGNIFICANT CHANGE UP
PHOSPHATE SERPL-MCNC: 2.9 MG/DL — SIGNIFICANT CHANGE UP (ref 2.5–4.5)
PLAT MORPH BLD: NORMAL — SIGNIFICANT CHANGE UP
PLATELET # BLD AUTO: 289 K/UL — SIGNIFICANT CHANGE UP (ref 150–400)
POIKILOCYTOSIS BLD QL AUTO: SLIGHT — SIGNIFICANT CHANGE UP
POTASSIUM SERPL-MCNC: 3.3 MMOL/L — LOW (ref 3.5–5.3)
POTASSIUM SERPL-SCNC: 3.3 MMOL/L — LOW (ref 3.5–5.3)
PROT SERPL-MCNC: 6.9 G/DL — SIGNIFICANT CHANGE UP (ref 6–8.3)
RBC # BLD: 5.15 M/UL — SIGNIFICANT CHANGE UP (ref 3.8–5.2)
RBC # FLD: 17.5 % — HIGH (ref 10.3–14.5)
RBC BLD AUTO: ABNORMAL
SODIUM SERPL-SCNC: 138 MMOL/L — SIGNIFICANT CHANGE UP (ref 135–145)
TARGETS BLD QL SMEAR: SLIGHT — SIGNIFICANT CHANGE UP
WBC # BLD: 4.07 K/UL — SIGNIFICANT CHANGE UP (ref 3.8–10.5)
WBC # FLD AUTO: 4.07 K/UL — SIGNIFICANT CHANGE UP (ref 3.8–10.5)

## 2020-03-14 PROCEDURE — 99232 SBSQ HOSP IP/OBS MODERATE 35: CPT

## 2020-03-14 RX ORDER — POTASSIUM CHLORIDE 20 MEQ
40 PACKET (EA) ORAL ONCE
Refills: 0 | Status: COMPLETED | OUTPATIENT
Start: 2020-03-14 | End: 2020-03-14

## 2020-03-14 RX ADMIN — Medication 80 MILLIGRAM(S): at 06:14

## 2020-03-14 RX ADMIN — Medication 650 MILLIGRAM(S): at 11:27

## 2020-03-14 RX ADMIN — ATORVASTATIN CALCIUM 80 MILLIGRAM(S): 80 TABLET, FILM COATED ORAL at 22:03

## 2020-03-14 RX ADMIN — Medication 650 MILLIGRAM(S): at 10:42

## 2020-03-14 RX ADMIN — ENOXAPARIN SODIUM 40 MILLIGRAM(S): 100 INJECTION SUBCUTANEOUS at 11:59

## 2020-03-14 RX ADMIN — Medication 80 MILLIGRAM(S): at 22:03

## 2020-03-14 RX ADMIN — Medication 40 MILLIEQUIVALENT(S): at 10:40

## 2020-03-14 RX ADMIN — Medication 81 MILLIGRAM(S): at 11:58

## 2020-03-14 RX ADMIN — CLOPIDOGREL BISULFATE 75 MILLIGRAM(S): 75 TABLET, FILM COATED ORAL at 11:58

## 2020-03-14 RX ADMIN — Medication 80 MILLIGRAM(S): at 13:25

## 2020-03-14 NOTE — PROGRESS NOTE ADULT - SUBJECTIVE AND OBJECTIVE BOX
CHIEF COMPLAINT:Patient is a 49y old  Female who presents with a chief complaint of R sided Numbness and weakness.Pt appears comfortable.    	  REVIEW OF SYSTEMS:  CONSTITUTIONAL: No fever, weight loss, or fatigue  EYES: No eye pain, visual disturbances, or discharge  ENT:  No difficulty hearing, tinnitus, vertigo; No sinus or throat pain  NECK: No pain or stiffness  RESPIRATORY: No cough, wheezing, chills or hemoptysis; No Shortness of Breath  CARDIOVASCULAR: No chest pain, palpitations, passing out, dizziness, or leg swelling  GASTROINTESTINAL: No abdominal or epigastric pain. No nausea, vomiting, or hematemesis; No diarrhea or constipation. No melena or hematochezia.  GENITOURINARY: No dysuria, frequency, hematuria, or incontinence  NEUROLOGICAL: No headaches, memory loss, loss of strength, numbness, or tremors  SKIN: No itching, burning, rashes, or lesions   LYMPH Nodes: No enlarged glands  ENDOCRINE: No heat or cold intolerance; No hair loss  MUSCULOSKELETAL: No joint pain or swelling; No muscle, back, or extremity pain  PSYCHIATRIC: No depression, anxiety, mood swings, or difficulty sleeping  HEME/LYMPH: No easy bruising, or bleeding gums  ALLERGY AND IMMUNOLOGIC: No hives or eczema	        PHYSICAL EXAM:  T(C): 36.8 (03-14-20 @ 07:33), Max: 36.8 (03-13-20 @ 23:01)  HR: 84 (03-14-20 @ 07:33) (74 - 86)  BP: 121/78 (03-14-20 @ 07:33) (121/78 - 144/94)  RR: 18 (03-14-20 @ 07:33) (18 - 18)  SpO2: 99% (03-14-20 @ 07:33) (99% - 100%)      13 Mar 2020 07:01  -  14 Mar 2020 07:00  --------------------------------------------------------  IN: 708 mL / OUT: 0 mL / NET: 708 mL        Appearance: Normal	  HEENT:   Normal oral mucosa, PERRL, EOMI	  Lymphatic: No lymphadenopathy  Cardiovascular: Normal S1 S2, No JVD, No murmurs, No edema  Respiratory: Lungs clear to auscultation	  Psychiatry: A & O x 3, Mood & affect appropriate  Gastrointestinal:  Soft, Non-tender, + BS	  Skin: No rashes, No ecchymoses, No cyanosis	  Neurologic: Non-focal  Extremities: Normal range of motion, No clubbing, cyanosis or edema  Vascular: Peripheral pulses palpable 2+ bilaterally    MEDICATIONS  (STANDING):  aspirin  chewable 81 milliGRAM(s) Oral daily  atorvastatin 80 milliGRAM(s) Oral at bedtime  clopidogrel Tablet 75 milliGRAM(s) Oral daily  enoxaparin Injectable 40 milliGRAM(s) SubCutaneous daily  ergocalciferol 35646 Unit(s) Oral <User Schedule>  influenza   Vaccine 0.5 milliLiter(s) IntraMuscular once  potassium chloride    Tablet ER 40 milliEquivalent(s) Oral once  verapamil 80 milliGRAM(s) Oral every 8 hours      	  LABS:	 	                       11.6   4.07  )-----------( 289      ( 14 Mar 2020 07:03 )             35.2     03-14    138  |  106  |  7   ----------------------------<  84  3.3<L>   |  24  |  0.74    Ca    8.3<L>      14 Mar 2020 07:03  Phos  2.9     03-14  Mg     1.9     03-14    TPro  6.9  /  Alb  3.0<L>  /  TBili  0.4  /  DBili  x   /  AST  28  /  ALT  29  /  AlkPhos  79  03-14      Lipid Profile: Cholesterol 140  LDL 81  HDL 50  TG 46    HgA1c: Hemoglobin A1C, Whole Blood: 5.7 % (03-11 @ 09:40)    TSH: Thyroid Stimulating Hormone, Serum: 0.56 uU/mL (03-11 @ 08:20)

## 2020-03-14 NOTE — CONSULT NOTE ADULT - ASSESSMENT
49 y F from home walks independently with PMHX of HTN ( non compliant with meds), Sickle cell trait( never received any transfusion) and PSHx of heart surgery?( for some clot) presented to ED with Numbness, tingling and weakness of the Right side since yesterday.  1.Tele monitoring.  2.D/C norvasc.  3.HTN and sinus tach-Inc verapamil 80mg tid.  4.CVA-asa,plavix,statin.  5.Neurology f/u.  6.GI and DVT prophylaxis.
49 y F from home walks independently with PMHX of HTN ( non compliant with meds), Sickle cell trait( never received any transfusion) and PSHx of heart surgery?( for some clot) presented to ED with Numbness, tingling and weakness of the Right side. GI consulted for persistent dysphagia     Plan:  Dysphagia   Likely form the patient's stoke  Unlikely EGD would be informative   Follow s/s reccs   aspiration precautions   Advanced care planning was discussed with patient and family.  Advanced care planning forms were reviewed and discussed.  Risks, benefits and alternatives of gastroenterologic procedures were discussed in detail and all questions were answered.

## 2020-03-14 NOTE — PROGRESS NOTE ADULT - SUBJECTIVE AND OBJECTIVE BOX
HPI:  49 y F from home walks independently with Mhx of HTN ( non compliant with meds), Sickle cell trait( never received any transfusion) and PSHx of heart surgery?( for some clot) presented to ED with Numbness, tingling and weakness of the Right side since yesterday. As per pt she was last seen normal 7am yesterday. Pt started having Numbness of the Right leg yesterday  which resolved after some time. Today pt woke up around 7 am with dizziness so went back to sleep. Pt then woke up at 10:30 am and noticed Numbness and tingling to the right side of face, arm and leg associated with weakness . Symptoms improve after 1 hr. Pt is having severe one sided headache every day for last 1 month. Pt is non compliant with her BP medication and donot check her BP at home   Pt denies any prior episode of numbness , h/o migraine, blurry vision, urinary or bladder incontinence ,seizures, CP, SOB, cough , Abd pain , nausea, vomiting or any urinary symptoms     ED course:   Vitals : Afebrile . 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   Utox -ve     SH : Denies Smoking, alcohol or drug use (10 Mar 2020 19:19)      Patient is a 49y old  Female who presents with a chief complaint of R sided Numbness and weakness (14 Mar 2020 10:06)      INTERVAL HPI/OVERNIGHT EVENTS:  T(C): 36.4 (03-14-20 @ 16:13), Max: 36.8 (03-13-20 @ 23:01)  HR: 79 (03-14-20 @ 16:13) (74 - 93)  BP: 145/87 (03-14-20 @ 16:13) (121/78 - 150/80)  RR: 18 (03-14-20 @ 16:13) (18 - 18)  SpO2: 97% (03-14-20 @ 16:13) (97% - 100%)  Wt(kg): --  I&O's Summary    13 Mar 2020 07:01  -  14 Mar 2020 07:00  --------------------------------------------------------  IN: 708 mL / OUT: 0 mL / NET: 708 mL        REVIEW OF SYSTEMS: denies fever, chills, SOB, palpitations, chest pain, abdominal pain, nausea, vomitting, diarrhea, constipation, dizziness    MEDICATIONS  (STANDING):  aspirin  chewable 81 milliGRAM(s) Oral daily  atorvastatin 80 milliGRAM(s) Oral at bedtime  clopidogrel Tablet 75 milliGRAM(s) Oral daily  enoxaparin Injectable 40 milliGRAM(s) SubCutaneous daily  ergocalciferol 25156 Unit(s) Oral <User Schedule>  influenza   Vaccine 0.5 milliLiter(s) IntraMuscular once  verapamil 80 milliGRAM(s) Oral every 8 hours    MEDICATIONS  (PRN):  acetaminophen   Tablet .. 650 milliGRAM(s) Oral every 6 hours PRN Temp greater or equal to 38C (100.4F), Mild Pain (1 - 3)  ketorolac   Injectable 30 milliGRAM(s) IV Push every 6 hours PRN Severe Pain (7 - 10)  traMADol 50 milliGRAM(s) Oral every 8 hours PRN Moderate Pain (4 - 6)      PHYSICAL EXAM:  GENERAL: NAD, well-groomed, well-developed  HEAD:  Atraumatic, Normocephalic  EYES: EOMI, PERRLA, conjunctiva and sclera clear  ENMT: No tonsillar erythema, exudates, or enlargement; Moist mucous membranes, Good dentition, No lesions  NECK: Supple, No JVD, Normal thyroid  NERVOUS SYSTEM:  Alert & Oriented X3, Good concentration; Motor Strength 5/5 B/L upper and lower extremities; DTRs 2+ intact and symmetric  CHEST/LUNG: Clear to percussion bilaterally; No rales, rhonchi, wheezing, or rubs  HEART: Regular rate and rhythm; No murmurs, rubs, or gallops  ABDOMEN: Soft, Nontender, Nondistended; Bowel sounds present  EXTREMITIES:  2+ Peripheral Pulses, No clubbing, cyanosis, or edema  LYMPH: No lymphadenopathy noted  SKIN: No rashes or lesions  LABS:                        11.6   4.07  )-----------( 289      ( 14 Mar 2020 07:03 )             35.2     03-14    138  |  106  |  7   ----------------------------<  84  3.3<L>   |  24  |  0.74    Ca    8.3<L>      14 Mar 2020 07:03  Phos  2.9     03-14  Mg     1.9     03-14    TPro  6.9  /  Alb  3.0<L>  /  TBili  0.4  /  DBili  x   /  AST  28  /  ALT  29  /  AlkPhos  79  03-14        CAPILLARY BLOOD GLUCOSE

## 2020-03-14 NOTE — CONSULT NOTE ADULT - SUBJECTIVE AND OBJECTIVE BOX
Patient is a 49y old  Female who presents with a chief complaint of R sided Numbness and weakness (14 Mar 2020 23:46)    49 y F from home walks independently with Mhx of HTN ( non compliant with meds), Sickle cell trait( never received any transfusion) and PSHx of heart surgery?( for some clot) presented to ED with Numbness, tingling and weakness of the Right side since yesterday. As per pt she was last seen normal 7am yesterday. Pt started having Numbness of the Right leg yesterday  which resolved after some time. Today pt woke up around 7 am with dizziness so went back to sleep. Pt then woke up at 10:30 am and noticed Numbness and tingling to the right side of face, arm and leg associated with weakness . Symptoms improve after 1 hr. Pt is having severe one sided headache every day for last 1 month. Pt is non compliant with her BP medication and donot check her BP at home     REVIEW OF SYSTEMS  Constitutional:   No fever, no fatigue, no pallor, no night sweats, no weight loss.  HEENT:   No eye pain, no vision changes, no icterus, no mouth ulcers.  Respiratory:   No shortness of breath, no cough, no respiratory distress.   Cardiovascular:   No chest pain, no palpitations.   Gastrointestinal: No abdominal pain, no nausea, no vomiting , no diahrrea, no constipation, no hematochezia,no melena.  Skin:   No rashes, no jaundice, no eczema.   Musculoskeletal:   No joint pain, no swelling, no myalgia.   Neurologic:   No headache, no seizure, no weakness.   Genitourinary:   No dysuria, no decreased urine output.  Psychiatric:  No depression, no anxiety,   Endocrine:   No thyroid disease, no diabetes.  Heme/Lymphatic:   No anemia, no blood transfusions, no lymph node enlargement, no bleeding, no bruising.  ___________________________________________________________________________________________  Allergies    No Known Allergies    Intolerances      MEDICATIONS  (STANDING):  aspirin  chewable 81 milliGRAM(s) Oral daily  atorvastatin 80 milliGRAM(s) Oral at bedtime  clopidogrel Tablet 75 milliGRAM(s) Oral daily  enoxaparin Injectable 40 milliGRAM(s) SubCutaneous daily  ergocalciferol 16210 Unit(s) Oral <User Schedule>  influenza   Vaccine 0.5 milliLiter(s) IntraMuscular once  verapamil 80 milliGRAM(s) Oral every 8 hours    MEDICATIONS  (PRN):  acetaminophen   Tablet .. 650 milliGRAM(s) Oral every 6 hours PRN Temp greater or equal to 38C (100.4F), Mild Pain (1 - 3)  ketorolac   Injectable 30 milliGRAM(s) IV Push every 6 hours PRN Severe Pain (7 - 10)  traMADol 50 milliGRAM(s) Oral every 8 hours PRN Moderate Pain (4 - 6)      PAST MEDICAL & SURGICAL HISTORY:  AS (sickle cell trait)  HTN (hypertension)  History of cholecystectomy  H/O heart surgery:     FAMILY HISTORY:  FH: HTN (hypertension)    Social History: No hsitory of : Tobacco use, IVDA, EToH  ______________________________________________________________________________________    PHYSICAL EXAM    Daily     Daily Weight in k.9 (14 Mar 2020 04:30)  BMI: 25.5 (03-10 @ 17:04)  Change in Weight:  Vital Signs Last 24 Hrs  T(C): 36.9 (14 Mar 2020 23:13), Max: 36.9 (14 Mar 2020 23:13)  T(F): 98.4 (14 Mar 2020 23:13), Max: 98.4 (14 Mar 2020 23:13)  HR: 79 (14 Mar 2020 23:13) (74 - 93)  BP: 136/87 (14 Mar 2020 23:13) (121/78 - 150/80)  BP(mean): --  RR: 18 (14 Mar 2020 23:13) (18 - 19)  SpO2: 99% (14 Mar 2020 23:13) (97% - 100%)    General:  Well developed, well nourished, alert and active, no pallor, NAD.  HEENT:    Normal appearance of conjunctiva, ears, nose, lips, oropharynx, and oral mucosa, anicteric.  Neck:  No masses, no asymmetry.  Lymph Nodes:  No lymphadenopathy.   Cardiovascular:  RRR normal S1/S2, no murmur.  Respiratory:  CTA B/L, normal respiratory effort.   Abdominal:   soft, no masses or tenderness, normoactive BS, NT/ND, no HSM.  Extremities:   No clubbing or cyanosis, normal capillary refill, no edema.   Skin:   No rash, jaundice, lesions, eczema.   Musculoskeletal:  No joint swelling, erythema or tenderness.   Neuro: No focal deficits.   Other:   _______________________________________________________________________________________________  Lab Results:                          11.6   4.07  )-----------( 289      ( 14 Mar 2020 07:03 )             35.2     03-14    138  |  106  |  7   ----------------------------<  84  3.3<L>   |  24  |  0.74    Ca    8.3<L>      14 Mar 2020 07:03  Phos  2.9     03-14  Mg     1.9     03-14    TPro  6.9  /  Alb  3.0<L>  /  TBili  0.4  /  DBili  x   /  AST  28  /  ALT  29  /  AlkPhos  79  03-14    LIVER FUNCTIONS - ( 14 Mar 2020 07:03 )  Alb: 3.0 g/dL / Pro: 6.9 g/dL / ALK PHOS: 79 U/L / ALT: 29 U/L DA / AST: 28 U/L / GGT: x                   Stool Results:          RADIOLOGY RESULTS:    SURGICAL PATHOLOGY:

## 2020-03-15 LAB
ALBUMIN SERPL ELPH-MCNC: 3.2 G/DL — LOW (ref 3.5–5)
ALP SERPL-CCNC: 90 U/L — SIGNIFICANT CHANGE UP (ref 40–120)
ALT FLD-CCNC: 38 U/L DA — SIGNIFICANT CHANGE UP (ref 10–60)
ANION GAP SERPL CALC-SCNC: 8 MMOL/L — SIGNIFICANT CHANGE UP (ref 5–17)
AST SERPL-CCNC: 33 U/L — SIGNIFICANT CHANGE UP (ref 10–40)
BASOPHILS # BLD AUTO: 0.02 K/UL — SIGNIFICANT CHANGE UP (ref 0–0.2)
BASOPHILS NFR BLD AUTO: 0.4 % — SIGNIFICANT CHANGE UP (ref 0–2)
BILIRUB SERPL-MCNC: 0.4 MG/DL — SIGNIFICANT CHANGE UP (ref 0.2–1.2)
BUN SERPL-MCNC: 9 MG/DL — SIGNIFICANT CHANGE UP (ref 7–18)
CALCIUM SERPL-MCNC: 8.2 MG/DL — LOW (ref 8.4–10.5)
CHLORIDE SERPL-SCNC: 108 MMOL/L — SIGNIFICANT CHANGE UP (ref 96–108)
CO2 SERPL-SCNC: 24 MMOL/L — SIGNIFICANT CHANGE UP (ref 22–31)
CREAT SERPL-MCNC: 0.78 MG/DL — SIGNIFICANT CHANGE UP (ref 0.5–1.3)
EOSINOPHIL # BLD AUTO: 0.05 K/UL — SIGNIFICANT CHANGE UP (ref 0–0.5)
EOSINOPHIL NFR BLD AUTO: 1 % — SIGNIFICANT CHANGE UP (ref 0–6)
GLUCOSE SERPL-MCNC: 92 MG/DL — SIGNIFICANT CHANGE UP (ref 70–99)
HCT VFR BLD CALC: 38 % — SIGNIFICANT CHANGE UP (ref 34.5–45)
HGB BLD-MCNC: 12.1 G/DL — SIGNIFICANT CHANGE UP (ref 11.5–15.5)
IMM GRANULOCYTES NFR BLD AUTO: 0.4 % — SIGNIFICANT CHANGE UP (ref 0–1.5)
LYMPHOCYTES # BLD AUTO: 2.12 K/UL — SIGNIFICANT CHANGE UP (ref 1–3.3)
LYMPHOCYTES # BLD AUTO: 43.9 % — SIGNIFICANT CHANGE UP (ref 13–44)
MAGNESIUM SERPL-MCNC: 2 MG/DL — SIGNIFICANT CHANGE UP (ref 1.6–2.6)
MCHC RBC-ENTMCNC: 21.6 PG — LOW (ref 27–34)
MCHC RBC-ENTMCNC: 31.8 GM/DL — LOW (ref 32–36)
MCV RBC AUTO: 67.7 FL — LOW (ref 80–100)
MONOCYTES # BLD AUTO: 0.54 K/UL — SIGNIFICANT CHANGE UP (ref 0–0.9)
MONOCYTES NFR BLD AUTO: 11.2 % — SIGNIFICANT CHANGE UP (ref 2–14)
NEUTROPHILS # BLD AUTO: 2.08 K/UL — SIGNIFICANT CHANGE UP (ref 1.8–7.4)
NEUTROPHILS NFR BLD AUTO: 43.1 % — SIGNIFICANT CHANGE UP (ref 43–77)
NRBC # BLD: 0 /100 WBCS — SIGNIFICANT CHANGE UP (ref 0–0)
PHOSPHATE SERPL-MCNC: 2.7 MG/DL — SIGNIFICANT CHANGE UP (ref 2.5–4.5)
PLATELET # BLD AUTO: 331 K/UL — SIGNIFICANT CHANGE UP (ref 150–400)
POTASSIUM SERPL-MCNC: 3.8 MMOL/L — SIGNIFICANT CHANGE UP (ref 3.5–5.3)
POTASSIUM SERPL-SCNC: 3.8 MMOL/L — SIGNIFICANT CHANGE UP (ref 3.5–5.3)
PROT SERPL-MCNC: 7.5 G/DL — SIGNIFICANT CHANGE UP (ref 6–8.3)
RBC # BLD: 5.61 M/UL — HIGH (ref 3.8–5.2)
RBC # FLD: 17.3 % — HIGH (ref 10.3–14.5)
SODIUM SERPL-SCNC: 140 MMOL/L — SIGNIFICANT CHANGE UP (ref 135–145)
WBC # BLD: 4.83 K/UL — SIGNIFICANT CHANGE UP (ref 3.8–10.5)
WBC # FLD AUTO: 4.83 K/UL — SIGNIFICANT CHANGE UP (ref 3.8–10.5)

## 2020-03-15 RX ORDER — LANOLIN ALCOHOL/MO/W.PET/CERES
3 CREAM (GRAM) TOPICAL AT BEDTIME
Refills: 0 | Status: DISCONTINUED | OUTPATIENT
Start: 2020-03-15 | End: 2020-03-18

## 2020-03-15 RX ADMIN — Medication 3 MILLIGRAM(S): at 22:32

## 2020-03-15 RX ADMIN — CLOPIDOGREL BISULFATE 75 MILLIGRAM(S): 75 TABLET, FILM COATED ORAL at 11:54

## 2020-03-15 RX ADMIN — Medication 81 MILLIGRAM(S): at 11:54

## 2020-03-15 RX ADMIN — Medication 80 MILLIGRAM(S): at 05:59

## 2020-03-15 RX ADMIN — Medication 80 MILLIGRAM(S): at 22:33

## 2020-03-15 RX ADMIN — ENOXAPARIN SODIUM 40 MILLIGRAM(S): 100 INJECTION SUBCUTANEOUS at 11:54

## 2020-03-15 RX ADMIN — ATORVASTATIN CALCIUM 80 MILLIGRAM(S): 80 TABLET, FILM COATED ORAL at 22:33

## 2020-03-15 RX ADMIN — Medication 80 MILLIGRAM(S): at 14:00

## 2020-03-15 RX ADMIN — TRAMADOL HYDROCHLORIDE 50 MILLIGRAM(S): 50 TABLET ORAL at 22:33

## 2020-03-15 RX ADMIN — TRAMADOL HYDROCHLORIDE 50 MILLIGRAM(S): 50 TABLET ORAL at 23:30

## 2020-03-15 NOTE — PROGRESS NOTE ADULT - SUBJECTIVE AND OBJECTIVE BOX
CHIEF COMPLAINT:Patient is a 49y old  Female who presents with a chief complaint of R sided Numbness and weakness.Pt appears comfortable.  	  REVIEW OF SYSTEMS:  CONSTITUTIONAL: No fever, weight loss, or fatigue  EYES: No eye pain, visual disturbances, or discharge  ENT:  No difficulty hearing, tinnitus, vertigo; No sinus or throat pain  NECK: No pain or stiffness  RESPIRATORY: No cough, wheezing, chills or hemoptysis; No Shortness of Breath  CARDIOVASCULAR: No chest pain, palpitations, passing out, dizziness, or leg swelling  GASTROINTESTINAL: No abdominal or epigastric pain. No nausea, vomiting, or hematemesis; No diarrhea or constipation. No melena or hematochezia.  GENITOURINARY: No dysuria, frequency, hematuria, or incontinence  NEUROLOGICAL: No headaches, memory loss, loss of strength, numbness, or tremors  SKIN: No itching, burning, rashes, or lesions   LYMPH Nodes: No enlarged glands  ENDOCRINE: No heat or cold intolerance; No hair loss  MUSCULOSKELETAL: No joint pain or swelling; No muscle, back, or extremity pain  PSYCHIATRIC: No depression, anxiety, mood swings, or difficulty sleeping  HEME/LYMPH: No easy bruising, or bleeding gums  ALLERGY AND IMMUNOLOGIC: No hives or eczema	        PHYSICAL EXAM:  T(C): 36.5 (03-15-20 @ 07:55), Max: 36.9 (03-14-20 @ 23:13)  HR: 75 (03-15-20 @ 07:55) (75 - 93)  BP: 116/70 (03-15-20 @ 07:55) (116/70 - 148/90)  RR: 18 (03-15-20 @ 07:55) (18 - 19)  SpO2: 99% (03-15-20 @ 07:55) (97% - 99%)  Wt(kg): --  I&O's Summary    14 Mar 2020 07:01  -  15 Mar 2020 07:00  --------------------------------------------------------  IN: 275 mL / OUT: 0 mL / NET: 275 mL        Appearance: Normal	  HEENT:   Normal oral mucosa, PERRL, EOMI	  Lymphatic: No lymphadenopathy  Cardiovascular: Normal S1 S2, No JVD, No murmurs, No edema  Respiratory: Lungs clear to auscultation	  Psychiatry: A & O x 3, Mood & affect appropriate  Gastrointestinal:  Soft, Non-tender, + BS	  Skin: No rashes, No ecchymoses, No cyanosis	  Neurologic: Non-focal  Extremities: Normal range of motion, No clubbing, cyanosis or edema  Vascular: Peripheral pulses palpable 2+ bilaterally    MEDICATIONS  (STANDING):  aspirin  chewable 81 milliGRAM(s) Oral daily  atorvastatin 80 milliGRAM(s) Oral at bedtime  clopidogrel Tablet 75 milliGRAM(s) Oral daily  enoxaparin Injectable 40 milliGRAM(s) SubCutaneous daily  ergocalciferol 39512 Unit(s) Oral <User Schedule>  influenza   Vaccine 0.5 milliLiter(s) IntraMuscular once  verapamil 80 milliGRAM(s) Oral every 8 hours        	  LABS:	 	                         12.1   4.83  )-----------( 331      ( 15 Mar 2020 07:16 )             38.0     03-15    140  |  108  |  9   ----------------------------<  92  3.8   |  24  |  0.78    Ca    8.2<L>      15 Mar 2020 07:16  Phos  2.7     03-15  Mg     2.0     03-15    TPro  7.5  /  Alb  3.2<L>  /  TBili  0.4  /  DBili  x   /  AST  33  /  ALT  38  /  AlkPhos  90  03-15      Lipid Profile: Cholesterol 140  LDL 81  HDL 50  TG 46    HgA1c: Hemoglobin A1C, Whole Blood: 5.7 % (03-11 @ 09:40)    TSH: Thyroid Stimulating Hormone, Serum: 0.56 uU/mL (03-11 @ 08:20)

## 2020-03-15 NOTE — DIETITIAN INITIAL EVALUATION ADULT. - OTHER INFO
Pt visited. Pt OOb  to chair. Pt is Czech speaking but able to make her needs Known in English. Pt reports Po tolerated. NKFA. S/P VFSS/MBS  Recc Pureed w  thin liquids. Pt offered Diet education but refused  written Literature. However Pt was educated verbally on  Low Fat Low Chol, Low sodium and Limit Caffeine. And  Encouraged W Medication compliance. Pt receptive. Per NSG  Flow sheet Pt eats 100 % of meal.  Today pt ate Good B fast So did not eat Much lunch. Pt reports Weight  being stable

## 2020-03-15 NOTE — PROGRESS NOTE ADULT - PROBLEM SELECTOR PLAN 1
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 Trace aortic regurgitation.  -f/u ct angio head and neck showed =Widely patent head and neck vasculature. No significant stenosis, occlusion, or dissection. Initial noncontrast images suggest a small lacunar infarct in the left thalamus.  -  A1c 5.7, Lipid profile wnl ,   Vit B12 wnl ,   -  PT eval SUGGEST MEI,   -official SnS suggested barium swallow which showed food stcking at c6,c7 level so  Dr Fisher consulted RECOMMEND NO INTERVENTION  - Neuro consult Dr Cyr  pt on asprin, clopidogrel, statin

## 2020-03-15 NOTE — PROGRESS NOTE ADULT - SUBJECTIVE AND OBJECTIVE BOX
PGY1 Note discussed with Supervising Resident and Primary Attending.    Patient is a 49y old  Female who presents with a chief complaint of R sided Numbness and weakness (14 Mar 2020 23:46)      INTERVAL HPI/OVERNIGHT EVENTS :    No acute event overnight reported by overnight team and nurses.   Pt remained hemodynamically stable.  Pt seen and examined  at bed side. All concerns and questions answered.   Report no new complaint. Pt denies any fever, nausea, vomiting.  Explained all test results and plan.  pt recommneded MEI    ***********************************************************************************************************    MEDICATIONS  (STANDING):  aspirin  chewable 81 milliGRAM(s) Oral daily  atorvastatin 80 milliGRAM(s) Oral at bedtime  clopidogrel Tablet 75 milliGRAM(s) Oral daily  enoxaparin Injectable 40 milliGRAM(s) SubCutaneous daily  ergocalciferol 74519 Unit(s) Oral <User Schedule>  influenza   Vaccine 0.5 milliLiter(s) IntraMuscular once  verapamil 80 milliGRAM(s) Oral every 8 hours    MEDICATIONS  (PRN):  acetaminophen   Tablet .. 650 milliGRAM(s) Oral every 6 hours PRN Temp greater or equal to 38C (100.4F), Mild Pain (1 - 3)  ketorolac   Injectable 30 milliGRAM(s) IV Push every 6 hours PRN Severe Pain (7 - 10)  melatonin 3 milliGRAM(s) Oral at bedtime PRN Insomnia  traMADol 50 milliGRAM(s) Oral every 8 hours PRN Moderate Pain (4 - 6)      ***********************************************************************************************************    Allergies    No Known Allergies    Intolerances        ***********************************************************************************************************    REVIEW OF SYSTEMS :  *  CONSTITUTIONAL      : No Fever,   * EYES                             : No eye pain ,  * RESPIRATORY             : No Cough,  * CARDIOVASCULAR     : No Chest pain,  * GASTROINTESTINAL  : No Abdominal or Epigastric pain. No Nausea, Vomiting  * GENITOURINARY        : No Dysuria ,  * NEUROLOGICAL          : No Headaches, pt has weakness on rt side   * MUSCULOSKELETAL   : No Joint pain, weakness on right side ,dysphagia diet  * PSYCHIATRY                 : No Depression   * HEME/LYMPH              : No Easy Bruising  * SKIN                               : No Itching, Burning,     ***********************************************************************************************************    Vital Signs Last 24 Hrs  T(C): 36.9 (14 Mar 2020 23:13), Max: 36.9 (14 Mar 2020 23:13)  T(F): 98.4 (14 Mar 2020 23:13), Max: 98.4 (14 Mar 2020 23:13)  HR: 79 (14 Mar 2020 23:13) (74 - 93)  BP: 136/87 (14 Mar 2020 23:13) (121/78 - 150/80)  BP(mean): --  RR: 18 (14 Mar 2020 23:13) (18 - 19)  SpO2: 99% (14 Mar 2020 23:13) (97% - 100%)    ***********************************************************************************************************    PHYSICAL EXAM :  *GENERAL                 : NAD, Well-groomed, Well-developed  * HEAD                       :  Atraumatic, Normocephalic  * EYES                         :  Conjunctiva and Sclera clear  * ENT                           : Moist Mucous Membranes  * NECK                         : Supple,   * CHEST/LUNG           : Clear to Auscultation bilaterally; No Rales,  * HEART                       :  No murmurs, Rubs or gallops  * ABDOMEN                : Soft, Non-tender, Non-distended; Bowel Sounds present  * NERVOUS SYSTEM  :  Alert & Oriented X3, Good Concentration; Motor Strength 2/5 om right sides both upper and lower extremity,  appearing ;  * EXTREMITIES            :  2+ Peripheral Pulses, No clubbing,  * SKIN                           : No Rashes         **********************************************************************************************************  LABS:                          11.6   4.07  )-----------( 289      ( 14 Mar 2020 07:03 )             35.2     03-14    138  |  106  |  7   ----------------------------<  84  3.3<L>   |  24  |  0.74    Ca    8.3<L>      14 Mar 2020 07:03  Phos  2.9     03-14  Mg     1.9     03-14    TPro  6.9  /  Alb  3.0<L>  /  TBili  0.4  /  DBili  x   /  AST  28  /  ALT  29  /  AlkPhos  79  03-14        CAPILLARY BLOOD GLUCOSE          **********************************************************************************************************    RADIOLOGY & ADDITIONAL TESTS:   No radiological imaging was required    Imaging Personally Reviewed   :  [X ] YES  [ ] NO    Consultant(s) Notes Reviewed :  [ X] YES  [ ] NO

## 2020-03-15 NOTE — PROGRESS NOTE ADULT - SUBJECTIVE AND OBJECTIVE BOX
Summary:   49y  Female      Subjective:       Objective:    MEDICATIONS  (STANDING):  aspirin  chewable 81 milliGRAM(s) Oral daily  atorvastatin 80 milliGRAM(s) Oral at bedtime  clopidogrel Tablet 75 milliGRAM(s) Oral daily  enoxaparin Injectable 40 milliGRAM(s) SubCutaneous daily  ergocalciferol 24959 Unit(s) Oral <User Schedule>  influenza   Vaccine 0.5 milliLiter(s) IntraMuscular once  verapamil 80 milliGRAM(s) Oral every 8 hours    MEDICATIONS  (PRN):  acetaminophen   Tablet .. 650 milliGRAM(s) Oral every 6 hours PRN Temp greater or equal to 38C (100.4F), Mild Pain (1 - 3)  ketorolac   Injectable 30 milliGRAM(s) IV Push every 6 hours PRN Severe Pain (7 - 10)  melatonin 3 milliGRAM(s) Oral at bedtime PRN Insomnia  traMADol 50 milliGRAM(s) Oral every 8 hours PRN Moderate Pain (4 - 6)              Vital Signs Last 24 Hrs  T(C): 36.6 (15 Mar 2020 11:29), Max: 36.9 (14 Mar 2020 23:13)  T(F): 97.8 (15 Mar 2020 11:29), Max: 98.5 (15 Mar 2020 04:33)  HR: 77 (15 Mar 2020 11:29) (75 - 81)  BP: 134/81 (15 Mar 2020 11:29) (116/70 - 148/90)  BP(mean): --  RR: 18 (15 Mar 2020 11:29) (18 - 19)  SpO2: 100% (15 Mar 2020 11:29) (97% - 100%)      General:  Well developed, well nourished, alert and active, no pallor, NAD.  HEENT:    Normal appearance of conjunctiva, ears, nose, lips, oropharynx, and oral mucosa, anicteric.  Neck:  No masses, no asymmetry.  Lymph Nodes:  No lymphadenopathy.   Cardiovascular:  RRR normal S1/S2, no murmur.  Respiratory:  CTA B/L, normal respiratory effort.   Abdominal:   soft, no masses or tenderness, normoactive BS, NT/ND, no HSM.  Extremities:   No clubbing or cyanosis, normal capillary refill, no edema.   Skin:   No rash, jaundice, lesions, eczema.   Musculoskeletal:  No joint swelling, erythema or tenderness.   Neuro: No focal deficits.   Other:       LABS:                        12.1   4.83  )-----------( 331      ( 15 Mar 2020 07:16 )             38.0     03-15    140  |  108  |  9   ----------------------------<  92  3.8   |  24  |  0.78    Ca    8.2<L>      15 Mar 2020 07:16  Phos  2.7     03-15  Mg     2.0     03-15    TPro  7.5  /  Alb  3.2<L>  /  TBili  0.4  /  DBili  x   /  AST  33  /  ALT  38  /  AlkPhos  90  03-15          RADIOLOGY & ADDITIONAL TESTS:

## 2020-03-15 NOTE — DIETITIAN INITIAL EVALUATION ADULT. - DIET TYPE
Dysphagia Pureed Honey Liquids DASH/TLC dysphagia 1, pureed, thin liquids/DASH/TLC (sodium and cholesterol restricted diet)

## 2020-03-15 NOTE — PROGRESS NOTE ADULT - PROBLEM SELECTOR PLAN 2
Pt non compliant with her med  -started on  verapamil 80 tid  -monitor BP  -CARDIOLOGSIT Dr. Jean STRESS AS outpt

## 2020-03-16 LAB
ALBUMIN SERPL ELPH-MCNC: 3 G/DL — LOW (ref 3.5–5)
ALP SERPL-CCNC: 80 U/L — SIGNIFICANT CHANGE UP (ref 40–120)
ALT FLD-CCNC: 46 U/L DA — SIGNIFICANT CHANGE UP (ref 10–60)
ANION GAP SERPL CALC-SCNC: 7 MMOL/L — SIGNIFICANT CHANGE UP (ref 5–17)
ANISOCYTOSIS BLD QL: SLIGHT — SIGNIFICANT CHANGE UP
AST SERPL-CCNC: 44 U/L — HIGH (ref 10–40)
BASOPHILS # BLD AUTO: 0.05 K/UL — SIGNIFICANT CHANGE UP (ref 0–0.2)
BASOPHILS NFR BLD AUTO: 1 % — SIGNIFICANT CHANGE UP (ref 0–2)
BILIRUB SERPL-MCNC: 0.3 MG/DL — SIGNIFICANT CHANGE UP (ref 0.2–1.2)
BUN SERPL-MCNC: 12 MG/DL — SIGNIFICANT CHANGE UP (ref 7–18)
CALCIUM SERPL-MCNC: 8.3 MG/DL — LOW (ref 8.4–10.5)
CHLORIDE SERPL-SCNC: 106 MMOL/L — SIGNIFICANT CHANGE UP (ref 96–108)
CO2 SERPL-SCNC: 25 MMOL/L — SIGNIFICANT CHANGE UP (ref 22–31)
CREAT SERPL-MCNC: 0.72 MG/DL — SIGNIFICANT CHANGE UP (ref 0.5–1.3)
EOSINOPHIL # BLD AUTO: 0 K/UL — SIGNIFICANT CHANGE UP (ref 0–0.5)
EOSINOPHIL NFR BLD AUTO: 0 % — SIGNIFICANT CHANGE UP (ref 0–6)
GLUCOSE SERPL-MCNC: 81 MG/DL — SIGNIFICANT CHANGE UP (ref 70–99)
HCT VFR BLD CALC: 35.4 % — SIGNIFICANT CHANGE UP (ref 34.5–45)
HGB BLD-MCNC: 11.6 G/DL — SIGNIFICANT CHANGE UP (ref 11.5–15.5)
LYMPHOCYTES # BLD AUTO: 2.13 K/UL — SIGNIFICANT CHANGE UP (ref 1–3.3)
LYMPHOCYTES # BLD AUTO: 46 % — HIGH (ref 13–44)
MAGNESIUM SERPL-MCNC: 2.1 MG/DL — SIGNIFICANT CHANGE UP (ref 1.6–2.6)
MANUAL SMEAR VERIFICATION: SIGNIFICANT CHANGE UP
MCHC RBC-ENTMCNC: 22.4 PG — LOW (ref 27–34)
MCHC RBC-ENTMCNC: 32.8 GM/DL — SIGNIFICANT CHANGE UP (ref 32–36)
MCV RBC AUTO: 68.5 FL — LOW (ref 80–100)
MICROCYTES BLD QL: SLIGHT — SIGNIFICANT CHANGE UP
MONOCYTES # BLD AUTO: 0.37 K/UL — SIGNIFICANT CHANGE UP (ref 0–0.9)
MONOCYTES NFR BLD AUTO: 8 % — SIGNIFICANT CHANGE UP (ref 2–14)
NEUTROPHILS # BLD AUTO: 1.71 K/UL — LOW (ref 1.8–7.4)
NEUTROPHILS NFR BLD AUTO: 37 % — LOW (ref 43–77)
NRBC # BLD: 0 /100 — SIGNIFICANT CHANGE UP (ref 0–0)
OVALOCYTES BLD QL SMEAR: SLIGHT — SIGNIFICANT CHANGE UP
PHOSPHATE SERPL-MCNC: 3.2 MG/DL — SIGNIFICANT CHANGE UP (ref 2.5–4.5)
PLAT MORPH BLD: NORMAL — SIGNIFICANT CHANGE UP
PLATELET # BLD AUTO: 285 K/UL — SIGNIFICANT CHANGE UP (ref 150–400)
POIKILOCYTOSIS BLD QL AUTO: SLIGHT — SIGNIFICANT CHANGE UP
POTASSIUM SERPL-MCNC: 3.8 MMOL/L — SIGNIFICANT CHANGE UP (ref 3.5–5.3)
POTASSIUM SERPL-SCNC: 3.8 MMOL/L — SIGNIFICANT CHANGE UP (ref 3.5–5.3)
PROT SERPL-MCNC: 6.9 G/DL — SIGNIFICANT CHANGE UP (ref 6–8.3)
RBC # BLD: 5.17 M/UL — SIGNIFICANT CHANGE UP (ref 3.8–5.2)
RBC # FLD: 17.4 % — HIGH (ref 10.3–14.5)
RBC BLD AUTO: ABNORMAL
SMUDGE CELLS # BLD: PRESENT — SIGNIFICANT CHANGE UP
SODIUM SERPL-SCNC: 138 MMOL/L — SIGNIFICANT CHANGE UP (ref 135–145)
VARIANT LYMPHS # BLD: 8 % — HIGH (ref 0–6)
WBC # BLD: 4.63 K/UL — SIGNIFICANT CHANGE UP (ref 3.8–10.5)
WBC # FLD AUTO: 4.63 K/UL — SIGNIFICANT CHANGE UP (ref 3.8–10.5)

## 2020-03-16 PROCEDURE — 70551 MRI BRAIN STEM W/O DYE: CPT | Mod: 26

## 2020-03-16 RX ORDER — CHLORTHALIDONE 50 MG
1 TABLET ORAL
Qty: 0 | Refills: 0 | DISCHARGE

## 2020-03-16 RX ORDER — LANOLIN ALCOHOL/MO/W.PET/CERES
1 CREAM (GRAM) TOPICAL
Qty: 0 | Refills: 0 | DISCHARGE
Start: 2020-03-16

## 2020-03-16 RX ORDER — ACETAMINOPHEN 500 MG
2 TABLET ORAL
Qty: 0 | Refills: 0 | DISCHARGE
Start: 2020-03-16

## 2020-03-16 RX ORDER — ATORVASTATIN CALCIUM 80 MG/1
1 TABLET, FILM COATED ORAL
Qty: 0 | Refills: 0 | DISCHARGE
Start: 2020-03-16

## 2020-03-16 RX ORDER — ASPIRIN/CALCIUM CARB/MAGNESIUM 324 MG
1 TABLET ORAL
Qty: 0 | Refills: 0 | DISCHARGE
Start: 2020-03-16

## 2020-03-16 RX ORDER — CLOPIDOGREL BISULFATE 75 MG/1
1 TABLET, FILM COATED ORAL
Qty: 0 | Refills: 0 | DISCHARGE
Start: 2020-03-16

## 2020-03-16 RX ORDER — VERAPAMIL HCL 240 MG
1 CAPSULE, EXTENDED RELEASE PELLETS 24 HR ORAL
Qty: 0 | Refills: 0 | DISCHARGE
Start: 2020-03-16

## 2020-03-16 RX ORDER — ERGOCALCIFEROL 1.25 MG/1
1 CAPSULE ORAL
Qty: 0 | Refills: 0 | DISCHARGE
Start: 2020-03-16

## 2020-03-16 RX ADMIN — ENOXAPARIN SODIUM 40 MILLIGRAM(S): 100 INJECTION SUBCUTANEOUS at 12:13

## 2020-03-16 RX ADMIN — Medication 81 MILLIGRAM(S): at 12:13

## 2020-03-16 RX ADMIN — Medication 80 MILLIGRAM(S): at 22:02

## 2020-03-16 RX ADMIN — CLOPIDOGREL BISULFATE 75 MILLIGRAM(S): 75 TABLET, FILM COATED ORAL at 12:13

## 2020-03-16 RX ADMIN — Medication 80 MILLIGRAM(S): at 16:53

## 2020-03-16 RX ADMIN — ATORVASTATIN CALCIUM 80 MILLIGRAM(S): 80 TABLET, FILM COATED ORAL at 22:02

## 2020-03-16 RX ADMIN — Medication 80 MILLIGRAM(S): at 05:32

## 2020-03-16 NOTE — PROGRESS NOTE ADULT - PROBLEM SELECTOR PLAN 1
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 Trace aortic regurgitation.  CT angio head and neck showed =Widely patent head and neck vasculature. No significant stenosis, occlusion, or dissection. Initial noncontrast images suggest a small lacunar infarct in the left thalamus.  A1c 5.7, Lipid profile wnl, Vit B12 wnl ,   PT eval SUGGEST MEI  Barium Swallow showed food sticking in C6-C7 and GI recommended no intervention  f/u MRI results per Dr. Benson

## 2020-03-16 NOTE — PROGRESS NOTE ADULT - SUBJECTIVE AND OBJECTIVE BOX
CHIEF COMPLAINT:Patient is a 49y old  Female who presents with a chief complaint of R sided Numbness and weakness.Pt appears comfortable.    	  REVIEW OF SYSTEMS:  CONSTITUTIONAL: No fever, weight loss, or fatigue  EYES: No eye pain, visual disturbances, or discharge  ENT:  No difficulty hearing, tinnitus, vertigo; No sinus or throat pain  NECK: No pain or stiffness  RESPIRATORY: No cough, wheezing, chills or hemoptysis; No Shortness of Breath  CARDIOVASCULAR: No chest pain, palpitations, passing out, dizziness, or leg swelling  GASTROINTESTINAL: No abdominal or epigastric pain. No nausea, vomiting, or hematemesis; No diarrhea or constipation. No melena or hematochezia.  GENITOURINARY: No dysuria, frequency, hematuria, or incontinence  NEUROLOGICAL: No headaches, memory loss, loss of strength, numbness, or tremors  SKIN: No itching, burning, rashes, or lesions   LYMPH Nodes: No enlarged glands  ENDOCRINE: No heat or cold intolerance; No hair loss  MUSCULOSKELETAL: No joint pain or swelling; No muscle, back, or extremity pain  PSYCHIATRIC: No depression, anxiety, mood swings, or difficulty sleeping  HEME/LYMPH: No easy bruising, or bleeding gums  ALLERGY AND IMMUNOLOGIC: No hives or eczema	        PHYSICAL EXAM:  T(C): 36.4 (03-16-20 @ 05:15), Max: 36.6 (03-15-20 @ 11:29)  HR: 67 (03-16-20 @ 05:15) (67 - 91)  BP: 119/72 (03-16-20 @ 05:15) (116/70 - 159/97)  RR: 18 (03-16-20 @ 05:15) (18 - 19)  SpO2: 100% (03-16-20 @ 05:15) (99% - 100%)  Wt(kg): --  I&O's Summary    15 Mar 2020 07:01  -  16 Mar 2020 07:00  --------------------------------------------------------  IN: 225 mL / OUT: 0 mL / NET: 225 mL        Appearance: Normal	  HEENT:   Normal oral mucosa, PERRL, EOMI	  Lymphatic: No lymphadenopathy  Cardiovascular: Normal S1 S2, No JVD, No murmurs, No edema  Respiratory: Lungs clear to auscultation	  Psychiatry: A & O x 3, Mood & affect appropriate  Gastrointestinal:  Soft, Non-tender, + BS	  Skin: No rashes, No ecchymoses, No cyanosis	  Neurologic: Non-focal  Extremities: Normal range of motion, No clubbing, cyanosis or edema  Vascular: Peripheral pulses palpable 2+ bilaterally    MEDICATIONS  (STANDING):  aspirin  chewable 81 milliGRAM(s) Oral daily  atorvastatin 80 milliGRAM(s) Oral at bedtime  clopidogrel Tablet 75 milliGRAM(s) Oral daily  enoxaparin Injectable 40 milliGRAM(s) SubCutaneous daily  ergocalciferol 10032 Unit(s) Oral <User Schedule>  influenza   Vaccine 0.5 milliLiter(s) IntraMuscular once  verapamil 80 milliGRAM(s) Oral every 8 hours      	  LABS:	 	                       12.1   4.83  )-----------( 331      ( 15 Mar 2020 07:16 )             38.0     03-16    138  |  106  |  12  ----------------------------<  81  3.8   |  25  |  0.72    Ca    8.3<L>      16 Mar 2020 06:47  Phos  3.2     03-16  Mg     2.1     03-16    TPro  6.9  /  Alb  3.0<L>  /  TBili  0.3  /  DBili  x   /  AST  44<H>  /  ALT  46  /  AlkPhos  80  03-16      Lipid Profile: Cholesterol 140  LDL 81  HDL 50  TG 46    HgA1c: Hemoglobin A1C, Whole Blood: 5.7 % (03-11 @ 09:40)    TSH: Thyroid Stimulating Hormone, Serum: 0.56 uU/mL (03-11 @ 08:20)

## 2020-03-16 NOTE — PROGRESS NOTE ADULT - SUBJECTIVE AND OBJECTIVE BOX
PGY1 Note discussed with supervising resident and primary attending.    Patient is a 49y old  Female who presents with a chief complaint of R sided Numbness and weakness (16 Mar 2020 07:44)    INTERVAL HPI/OVERNIGHT EVENTS:  - No acute events overnight  - Patient states that she continues to have weakness in her right side   - Hemodynamically stable and afebrile   - Tolerating her diet (puree food)    MEDICATIONS  (STANDING):  aspirin  chewable 81 milliGRAM(s) Oral daily  atorvastatin 80 milliGRAM(s) Oral at bedtime  clopidogrel Tablet 75 milliGRAM(s) Oral daily  enoxaparin Injectable 40 milliGRAM(s) SubCutaneous daily  ergocalciferol 72965 Unit(s) Oral <User Schedule>  influenza   Vaccine 0.5 milliLiter(s) IntraMuscular once  verapamil 80 milliGRAM(s) Oral every 8 hours    MEDICATIONS  (PRN):  acetaminophen   Tablet .. 650 milliGRAM(s) Oral every 6 hours PRN Temp greater or equal to 38C (100.4F), Mild Pain (1 - 3)  melatonin 3 milliGRAM(s) Oral at bedtime PRN Insomnia  traMADol 50 milliGRAM(s) Oral every 8 hours PRN Moderate Pain (4 - 6)    Allergies    No Known Allergies    Intolerances    REVIEW OF SYSTEMS:  CONSTITUTIONAL: No fever, weight loss, or fatigue  RESPIRATORY: No cough, wheezing, chills or hemoptysis; No shortness of breath  CARDIOVASCULAR: No chest pain, palpitations, dizziness, or leg swelling  GASTROINTESTINAL: No abdominal or epigastric pain. No nausea, vomiting, or hematemesis; No diarrhea or constipation. No melena or hematochezia.  NEUROLOGICAL: No headaches, memory loss, loss of strength, numbness, or tremors  SKIN: No itching, burning, rashes, or lesions     Vital Signs Last 24 Hrs  T(C): 36.4 (16 Mar 2020 07:50), Max: 36.6 (15 Mar 2020 11:29)  T(F): 97.5 (16 Mar 2020 07:50), Max: 97.8 (15 Mar 2020 11:29)  HR: 67 (16 Mar 2020 07:50) (67 - 91)  BP: 121/71 (16 Mar 2020 07:50) (119/72 - 159/97)  BP(mean): --  RR: 17 (16 Mar 2020 07:50) (17 - 19)  SpO2: 100% (16 Mar 2020 07:50) (100% - 100%)    PHYSICAL EXAM:  GENERAL: NAD, well-groomed, well-developed  HEAD:  Atraumatic, Normocephalic  EYES: EOMI, PERRLA, conjunctiva and sclera clear  NECK: Supple, No JVD, Normal thyroid  CHEST/LUNG: Clear to percussion bilaterally; No rales, rhonchi, wheezing, or rubs  HEART: Regular rate and rhythm; No murmurs, rubs, or gallops  ABDOMEN: Soft, Nontender, Nondistended; Bowel sounds present  NERVOUS SYSTEM:  Alert & Oriented X3, Good concentration; Motor Strength 2/5 on RUE and RLE vs. 5/5 on LUE and LLE  EXTREMITIES:  2+ Peripheral Pulses, No clubbing, cyanosis, or edema  SKIN: Warm, Dry    LABS:                        11.6   4.63  )-----------( 285      ( 16 Mar 2020 06:47 )             35.4     03-16    138  |  106  |  12  ----------------------------<  81  3.8   |  25  |  0.72    Ca    8.3<L>      16 Mar 2020 06:47  Phos  3.2     03-16  Mg     2.1     03-16    TPro  6.9  /  Alb  3.0<L>  /  TBili  0.3  /  DBili  x   /  AST  44<H>  /  ALT  46  /  AlkPhos  80  03-16    CAPILLARY BLOOD GLUCOSE    RADIOLOGY & ADDITIONAL TESTS:    Imaging Personally Reviewed:  [ ] YES  [ ] NO    Consultant(s) Notes Reviewed:  [ ] YES  [ ] NO

## 2020-03-16 NOTE — PROGRESS NOTE ADULT - SUBJECTIVE AND OBJECTIVE BOX
HPI:  49 y F from home walks independently with Mhx of HTN ( non compliant with meds), Sickle cell trait( never received any transfusion) and PSHx of heart surgery?( for some clot) presented to ED with Numbness, tingling and weakness of the Right side since yesterday. As per pt she was last seen normal 7am yesterday. Pt started having Numbness of the Right leg yesterday  which resolved after some time. Today pt woke up around 7 am with dizziness so went back to sleep. Pt then woke up at 10:30 am and noticed Numbness and tingling to the right side of face, arm and leg associated with weakness . Symptoms improve after 1 hr. Pt is having severe one sided headache every day for last 1 month. Pt is non compliant with her BP medication and donot check her BP at home   Pt denies any prior episode of numbness , h/o migraine, blurry vision, urinary or bladder incontinence ,seizures, CP, SOB, cough , Abd pain , nausea, vomiting or any urinary symptoms     ED course:   Vitals : Afebrile . 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   Utox -ve     SH : Denies Smoking, alcohol or drug use (10 Mar 2020 19:19)      Patient is a 49y old  Female who presents with a chief complaint of R sided Numbness and weakness (16 Mar 2020 11:19)      INTERVAL HPI/OVERNIGHT EVENTS:  T(C): 36.4 (03-16-20 @ 11:25), Max: 36.4 (03-15-20 @ 16:58)  HR: 73 (03-16-20 @ 11:25) (67 - 91)  BP: 131/80 (03-16-20 @ 11:25) (119/72 - 159/97)  RR: 18 (03-16-20 @ 11:25) (17 - 19)  SpO2: 98% (03-16-20 @ 11:25) (98% - 100%)  Wt(kg): --  I&O's Summary    15 Mar 2020 07:01  -  16 Mar 2020 07:00  --------------------------------------------------------  IN: 225 mL / OUT: 0 mL / NET: 225 mL        REVIEW OF SYSTEMS: denies fever, chills, SOB, palpitations, chest pain, abdominal pain, nausea, vomitting, diarrhea, constipation, dizziness    MEDICATIONS  (STANDING):  aspirin  chewable 81 milliGRAM(s) Oral daily  atorvastatin 80 milliGRAM(s) Oral at bedtime  clopidogrel Tablet 75 milliGRAM(s) Oral daily  enoxaparin Injectable 40 milliGRAM(s) SubCutaneous daily  ergocalciferol 31499 Unit(s) Oral <User Schedule>  influenza   Vaccine 0.5 milliLiter(s) IntraMuscular once  verapamil 80 milliGRAM(s) Oral every 8 hours    MEDICATIONS  (PRN):  acetaminophen   Tablet .. 650 milliGRAM(s) Oral every 6 hours PRN Temp greater or equal to 38C (100.4F), Mild Pain (1 - 3)  melatonin 3 milliGRAM(s) Oral at bedtime PRN Insomnia  traMADol 50 milliGRAM(s) Oral every 8 hours PRN Moderate Pain (4 - 6)      PHYSICAL EXAM:  GENERAL: NAD, well-groomed, well-developed  HEAD:  Atraumatic, Normocephalic  EYES: EOMI, PERRLA, conjunctiva and sclera clear  ENMT: No tonsillar erythema, exudates, or enlargement; Moist mucous membranes, Good dentition, No lesions  NECK: Supple, No JVD, Normal thyroid  NERVOUS SYSTEM:  Alert & Oriented X3, Good concentration; Motor Strength 5/5 B/L upper and lower extremities; DTRs 2+ intact and symmetric  CHEST/LUNG: Clear to percussion bilaterally; No rales, rhonchi, wheezing, or rubs  HEART: Regular rate and rhythm; No murmurs, rubs, or gallops  ABDOMEN: Soft, Nontender, Nondistended; Bowel sounds present  EXTREMITIES:  2+ Peripheral Pulses, No clubbing, cyanosis, or edema  LYMPH: No lymphadenopathy noted  SKIN: No rashes or lesions  LABS:                        11.6   4.63  )-----------( 285      ( 16 Mar 2020 06:47 )             35.4     03-16    138  |  106  |  12  ----------------------------<  81  3.8   |  25  |  0.72    Ca    8.3<L>      16 Mar 2020 06:47  Phos  3.2     03-16  Mg     2.1     03-16    TPro  6.9  /  Alb  3.0<L>  /  TBili  0.3  /  DBili  x   /  AST  44<H>  /  ALT  46  /  AlkPhos  80  03-16        CAPILLARY BLOOD GLUCOSE

## 2020-03-17 DIAGNOSIS — Z71.89 OTHER SPECIFIED COUNSELING: ICD-10-CM

## 2020-03-17 DIAGNOSIS — R71.8 OTHER ABNORMALITY OF RED BLOOD CELLS: ICD-10-CM

## 2020-03-17 LAB
ANION GAP SERPL CALC-SCNC: 6 MMOL/L — SIGNIFICANT CHANGE UP (ref 5–17)
BASOPHILS # BLD AUTO: SIGNIFICANT CHANGE UP K/UL (ref 0–0.2)
BASOPHILS NFR BLD AUTO: SIGNIFICANT CHANGE UP % (ref 0–2)
BUN SERPL-MCNC: 9 MG/DL — SIGNIFICANT CHANGE UP (ref 7–18)
CALCIUM SERPL-MCNC: 8.2 MG/DL — LOW (ref 8.4–10.5)
CHLORIDE SERPL-SCNC: 105 MMOL/L — SIGNIFICANT CHANGE UP (ref 96–108)
CO2 SERPL-SCNC: 26 MMOL/L — SIGNIFICANT CHANGE UP (ref 22–31)
CREAT SERPL-MCNC: 0.74 MG/DL — SIGNIFICANT CHANGE UP (ref 0.5–1.3)
EOSINOPHIL # BLD AUTO: SIGNIFICANT CHANGE UP K/UL (ref 0–0.5)
EOSINOPHIL NFR BLD AUTO: SIGNIFICANT CHANGE UP % (ref 0–6)
FERRITIN SERPL-MCNC: 12 NG/ML — LOW (ref 15–150)
GLUCOSE SERPL-MCNC: 84 MG/DL — SIGNIFICANT CHANGE UP (ref 70–99)
HCT VFR BLD CALC: 36.2 % — SIGNIFICANT CHANGE UP (ref 34.5–45)
HGB BLD-MCNC: 11.8 G/DL — SIGNIFICANT CHANGE UP (ref 11.5–15.5)
IMM GRANULOCYTES NFR BLD AUTO: SIGNIFICANT CHANGE UP % (ref 0–1.5)
IRON SATN MFR SERPL: 26 UG/DL — LOW (ref 40–150)
IRON SATN MFR SERPL: 7 % — LOW (ref 15–50)
LYMPHOCYTES # BLD AUTO: SIGNIFICANT CHANGE UP % (ref 13–44)
LYMPHOCYTES # BLD AUTO: SIGNIFICANT CHANGE UP K/UL (ref 1–3.3)
MAGNESIUM SERPL-MCNC: 1.9 MG/DL — SIGNIFICANT CHANGE UP (ref 1.6–2.6)
MCHC RBC-ENTMCNC: 22.5 PG — LOW (ref 27–34)
MCHC RBC-ENTMCNC: 32.6 GM/DL — SIGNIFICANT CHANGE UP (ref 32–36)
MCV RBC AUTO: 69 FL — LOW (ref 80–100)
MONOCYTES # BLD AUTO: SIGNIFICANT CHANGE UP K/UL (ref 0–0.9)
MONOCYTES NFR BLD AUTO: SIGNIFICANT CHANGE UP % (ref 2–14)
NEUTROPHILS # BLD AUTO: SIGNIFICANT CHANGE UP K/UL (ref 1.8–7.4)
NEUTROPHILS NFR BLD AUTO: SIGNIFICANT CHANGE UP % (ref 43–77)
NRBC # BLD: 0 /100 WBCS — SIGNIFICANT CHANGE UP (ref 0–0)
PHOSPHATE SERPL-MCNC: 2.8 MG/DL — SIGNIFICANT CHANGE UP (ref 2.5–4.5)
PLATELET # BLD AUTO: 289 K/UL — SIGNIFICANT CHANGE UP (ref 150–400)
POTASSIUM SERPL-MCNC: 3.7 MMOL/L — SIGNIFICANT CHANGE UP (ref 3.5–5.3)
POTASSIUM SERPL-SCNC: 3.7 MMOL/L — SIGNIFICANT CHANGE UP (ref 3.5–5.3)
RBC # BLD: 5.25 M/UL — HIGH (ref 3.8–5.2)
RBC # FLD: 17.2 % — HIGH (ref 10.3–14.5)
SODIUM SERPL-SCNC: 137 MMOL/L — SIGNIFICANT CHANGE UP (ref 135–145)
TIBC SERPL-MCNC: 394 UG/DL — SIGNIFICANT CHANGE UP (ref 250–450)
UIBC SERPL-MCNC: 368 UG/DL — SIGNIFICANT CHANGE UP (ref 110–370)
WBC # BLD: 5.63 K/UL — SIGNIFICANT CHANGE UP (ref 3.8–10.5)
WBC # FLD AUTO: 5.63 K/UL — SIGNIFICANT CHANGE UP (ref 3.8–10.5)

## 2020-03-17 RX ADMIN — Medication 80 MILLIGRAM(S): at 21:53

## 2020-03-17 RX ADMIN — Medication 80 MILLIGRAM(S): at 05:18

## 2020-03-17 RX ADMIN — ENOXAPARIN SODIUM 40 MILLIGRAM(S): 100 INJECTION SUBCUTANEOUS at 13:01

## 2020-03-17 RX ADMIN — Medication 80 MILLIGRAM(S): at 13:01

## 2020-03-17 RX ADMIN — ATORVASTATIN CALCIUM 80 MILLIGRAM(S): 80 TABLET, FILM COATED ORAL at 21:53

## 2020-03-17 RX ADMIN — Medication 81 MILLIGRAM(S): at 13:01

## 2020-03-17 RX ADMIN — CLOPIDOGREL BISULFATE 75 MILLIGRAM(S): 75 TABLET, FILM COATED ORAL at 13:01

## 2020-03-17 NOTE — PROGRESS NOTE ADULT - PROBLEM SELECTOR PLAN 2
MCV of 67-70  Sickle Cell Trait +ve per history   f/u Iron Anemia Panel   f/u Hemoglobin Electrophoresis / Sickle Cell Electrophoresis for Thalassemia

## 2020-03-17 NOTE — PROGRESS NOTE ADULT - SUBJECTIVE AND OBJECTIVE BOX
Summary:   49y  Female      Subjective:   Dysphagia is improving     Objective:    MEDICATIONS  (STANDING):  aspirin  chewable 81 milliGRAM(s) Oral daily  atorvastatin 80 milliGRAM(s) Oral at bedtime  clopidogrel Tablet 75 milliGRAM(s) Oral daily  enoxaparin Injectable 40 milliGRAM(s) SubCutaneous daily  ergocalciferol 67623 Unit(s) Oral <User Schedule>  influenza   Vaccine 0.5 milliLiter(s) IntraMuscular once  verapamil 80 milliGRAM(s) Oral every 8 hours    MEDICATIONS  (PRN):  acetaminophen   Tablet .. 650 milliGRAM(s) Oral every 6 hours PRN Temp greater or equal to 38C (100.4F), Mild Pain (1 - 3)  ketorolac   Injectable 30 milliGRAM(s) IV Push every 6 hours PRN Severe Pain (7 - 10)  melatonin 3 milliGRAM(s) Oral at bedtime PRN Insomnia  traMADol 50 milliGRAM(s) Oral every 8 hours PRN Moderate Pain (4 - 6)              Vital Signs Last 24 Hrs  T(C): 36.6 (15 Mar 2020 11:29), Max: 36.9 (14 Mar 2020 23:13)  T(F): 97.8 (15 Mar 2020 11:29), Max: 98.5 (15 Mar 2020 04:33)  HR: 77 (15 Mar 2020 11:29) (75 - 81)  BP: 134/81 (15 Mar 2020 11:29) (116/70 - 148/90)  BP(mean): --  RR: 18 (15 Mar 2020 11:29) (18 - 19)  SpO2: 100% (15 Mar 2020 11:29) (97% - 100%)      General:  Well developed, well nourished, alert and active, no pallor, NAD.  HEENT:    Normal appearance of conjunctiva, ears, nose, lips, oropharynx, and oral mucosa, anicteric.  Neck:  No masses, no asymmetry.  Lymph Nodes:  No lymphadenopathy.   Cardiovascular:  RRR normal S1/S2, no murmur.  Respiratory:  CTA B/L, normal respiratory effort.   Abdominal:   soft, no masses or tenderness, normoactive BS, NT/ND, no HSM.  Extremities:   No clubbing or cyanosis, normal capillary refill, no edema.   Skin:   No rash, jaundice, lesions, eczema.   Musculoskeletal:  No joint swelling, erythema or tenderness.   Neuro: No focal deficits.   Other:       LABS:                        12.1   4.83  )-----------( 331      ( 15 Mar 2020 07:16 )             38.0     03-15    140  |  108  |  9   ----------------------------<  92  3.8   |  24  |  0.78    Ca    8.2<L>      15 Mar 2020 07:16  Phos  2.7     03-15  Mg     2.0     03-15    TPro  7.5  /  Alb  3.2<L>  /  TBili  0.4  /  DBili  x   /  AST  33  /  ALT  38  /  AlkPhos  90  03-15          RADIOLOGY & ADDITIONAL TESTS:

## 2020-03-17 NOTE — PROGRESS NOTE ADULT - PROBLEM SELECTOR PLAN 1
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 Trace aortic regurgitation.  CT angio head and neck showed =Widely patent head and neck vasculature. No significant stenosis, occlusion, or dissection. Initial noncontrast images suggest a small lacunar infarct in the left thalamus.  A1c 5.7, Lipid profile wnl, Vit B12 wnl ,   PT eval SUGGEST MEI  Barium Swallow showed food sticking in C6-C7 and GI recommended no intervention  MRI shows left pontine acute infarct

## 2020-03-17 NOTE — PROGRESS NOTE ADULT - SUBJECTIVE AND OBJECTIVE BOX
PGY1 Note discussed with supervising resident and primary attending.    Patient is a 49y old  Female who presents with a chief complaint of R sided Numbness and weakness (17 Mar 2020 08:32)    INTERVAL HPI/OVERNIGHT EVENTS:  - No acute events overnight  - Patient did not appreciate her current diet (upgraded patient to regular diet); was able to eat fruit with no issue  - Hemodynamically stable and afebrile  - States she is feeling much better than yesterday  - Strength has increased in the right side     MEDICATIONS  (STANDING):  aspirin  chewable 81 milliGRAM(s) Oral daily  atorvastatin 80 milliGRAM(s) Oral at bedtime  clopidogrel Tablet 75 milliGRAM(s) Oral daily  enoxaparin Injectable 40 milliGRAM(s) SubCutaneous daily  ergocalciferol 91254 Unit(s) Oral <User Schedule>  influenza   Vaccine 0.5 milliLiter(s) IntraMuscular once  verapamil 80 milliGRAM(s) Oral every 8 hours    MEDICATIONS  (PRN):  acetaminophen   Tablet .. 650 milliGRAM(s) Oral every 6 hours PRN Temp greater or equal to 38C (100.4F), Mild Pain (1 - 3)  melatonin 3 milliGRAM(s) Oral at bedtime PRN Insomnia  traMADol 50 milliGRAM(s) Oral every 8 hours PRN Moderate Pain (4 - 6)    Allergies    No Known Allergies    Intolerances    REVIEW OF SYSTEMS:  CONSTITUTIONAL: No fever, weight loss, or fatigue  RESPIRATORY: No cough, wheezing, chills or hemoptysis; No shortness of breath  CARDIOVASCULAR: No chest pain, palpitations, dizziness, or leg swelling  GASTROINTESTINAL: Did not like the diet and was upgraded to regular   NEUROLOGICAL: No headaches, memory loss, loss of strength, numbness, or tremors  SKIN: No itching, burning, rashes, or lesions     Vital Signs Last 24 Hrs  T(C): 36.5 (17 Mar 2020 08:04), Max: 36.9 (16 Mar 2020 19:45)  T(F): 97.7 (17 Mar 2020 08:04), Max: 98.5 (16 Mar 2020 19:45)  HR: 64 (17 Mar 2020 08:04) (62 - 78)  BP: 118/64 (17 Mar 2020 08:04) (118/64 - 143/82)  BP(mean): --  RR: 17 (17 Mar 2020 08:04) (16 - 18)  SpO2: 100% (17 Mar 2020 08:04) (98% - 100%)    PHYSICAL EXAM:  GENERAL: NAD, well-groomed, well-developed  HEAD:  Atraumatic, Normocephalic  EYES: EOMI, PERRLA, conjunctiva and sclera clear  NECK: Supple, No JVD, Normal thyroid  CHEST/LUNG: Clear to percussion bilaterally; No rales, rhonchi, wheezing, or rubs  HEART: Regular rate and rhythm; No murmurs, rubs, or gallops  ABDOMEN: Soft, Nontender, Nondistended; Bowel sounds present  NERVOUS SYSTEM:  Alert & Oriented X3, Good concentration; Motor Strength 3/5 on RUE and RLE vs. 5/5 on LUE and LLE (improved from 03/16)  EXTREMITIES:  2+ Peripheral Pulses, No clubbing, cyanosis, or edema  SKIN: Warm, Dry    LABS:                        11.8   5.63  )-----------( 289      ( 17 Mar 2020 06:49 )             36.2     03-17    137  |  105  |  9   ----------------------------<  84  3.7   |  26  |  0.74    Ca    8.2<L>      17 Mar 2020 06:49  Phos  2.8     03-17  Mg     1.9     03-17    TPro  6.9  /  Alb  3.0<L>  /  TBili  0.3  /  DBili  x   /  AST  44<H>  /  ALT  46  /  AlkPhos  80  03-16    CAPILLARY BLOOD GLUCOSE    RADIOLOGY & ADDITIONAL TESTS:    Imaging Personally Reviewed:  [ ] YES  [ ] NO    Consultant(s) Notes Reviewed:  [ ] YES  [ ] NO

## 2020-03-17 NOTE — PROGRESS NOTE ADULT - SUBJECTIVE AND OBJECTIVE BOX
CHIEF COMPLAINT:Patient is a 49y old  Female who presents with a chief complaint of R sided Numbness and weakness .Pt appears comfortable.    	  REVIEW OF SYSTEMS:  CONSTITUTIONAL: No fever, weight loss, or fatigue  EYES: No eye pain, visual disturbances, or discharge  ENT:  No difficulty hearing, tinnitus, vertigo; No sinus or throat pain  NECK: No pain or stiffness  RESPIRATORY: No cough, wheezing, chills or hemoptysis; No Shortness of Breath  CARDIOVASCULAR: No chest pain, palpitations, passing out, dizziness, or leg swelling  GASTROINTESTINAL: No abdominal or epigastric pain. No nausea, vomiting, or hematemesis; No diarrhea or constipation. No melena or hematochezia.  GENITOURINARY: No dysuria, frequency, hematuria, or incontinence  NEUROLOGICAL: No headaches, memory loss, loss of strength, numbness, or tremors  SKIN: No itching, burning, rashes, or lesions   LYMPH Nodes: No enlarged glands  ENDOCRINE: No heat or cold intolerance; No hair loss  MUSCULOSKELETAL: No joint pain or swelling; No muscle, back, or extremity pain  PSYCHIATRIC: No depression, anxiety, mood swings, or difficulty sleeping  HEME/LYMPH: No easy bruising, or bleeding gums  ALLERGY AND IMMUNOLOGIC: No hives or eczema	        PHYSICAL EXAM:  T(C): 36.5 (03-17-20 @ 08:04), Max: 36.9 (03-16-20 @ 19:45)  HR: 64 (03-17-20 @ 08:04) (62 - 78)  BP: 118/64 (03-17-20 @ 08:04) (118/64 - 143/82)  RR: 17 (03-17-20 @ 08:04) (16 - 18)  SpO2: 100% (03-17-20 @ 08:04) (98% - 100%)        Appearance: Normal	  HEENT:   Normal oral mucosa, PERRL, EOMI	  Lymphatic: No lymphadenopathy  Cardiovascular: Normal S1 S2, No JVD, No murmurs, No edema  Respiratory: Lungs clear to auscultation	  Psychiatry: A & O x 3, Mood & affect appropriate  Gastrointestinal:  Soft, Non-tender, + BS	  Skin: No rashes, No ecchymoses, No cyanosis	  Neurologic: Non-focal  Extremities: Normal range of motion, No clubbing, cyanosis or edema  Vascular: Peripheral pulses palpable 2+ bilaterally    MEDICATIONS  (STANDING):  aspirin  chewable 81 milliGRAM(s) Oral daily  atorvastatin 80 milliGRAM(s) Oral at bedtime  clopidogrel Tablet 75 milliGRAM(s) Oral daily  enoxaparin Injectable 40 milliGRAM(s) SubCutaneous daily  ergocalciferol 54290 Unit(s) Oral <User Schedule>  influenza   Vaccine 0.5 milliLiter(s) IntraMuscular once  verapamil 80 milliGRAM(s) Oral every 8 hours      	  	  LABS:	 	                        11.8   5.63  )-----------( 289      ( 17 Mar 2020 06:49 )             36.2     03-17    137  |  105  |  9   ----------------------------<  84  3.7   |  26  |  0.74    Ca    8.2<L>      17 Mar 2020 06:49  Phos  2.8     03-17  Mg     1.9     03-17    TPro  6.9  /  Alb  3.0<L>  /  TBili  0.3  /  DBili  x   /  AST  44<H>  /  ALT  46  /  AlkPhos  80  03-16      Lipid Profile: Cholesterol 140  LDL 81  HDL 50  TG 46    HgA1c: Hemoglobin A1C, Whole Blood: 5.7 % (03-11 @ 09:40)    TSH: Thyroid Stimulating Hormone, Serum: 0.56 uU/mL (03-11 @ 08:20)      	  EXAM:  MR BRAIN                            PROCEDURE DATE:  03/16/2020          INTERPRETATION:  MRI brain without contrast    History right-sided hemiplegia    Comparison CT performed 3 days prior    There are several small foci of diffusion restriction and matching edema in the left posterior cerebral artery distribution. The largest is in the thalamus, measuring 12 mm in long axis dimension with smaller foci extending posteriorly and to the medial occipital cortex, the largest measuring 8 mm. There is no mass effect or associated hemorrhage. Cortical volume and white matter signal are fairly well-preserved. The orbital and sellar contents and cerebellar tonsils are within normal limits.    IMPRESSION:    Small acute infarcts in the left PCA distribution as above

## 2020-03-17 NOTE — PROGRESS NOTE ADULT - SUBJECTIVE AND OBJECTIVE BOX
HPI:  49 y F from home walks independently with Mhx of HTN ( non compliant with meds), Sickle cell trait( never received any transfusion) and PSHx of heart surgery?( for some clot) presented to ED with Numbness, tingling and weakness of the Right side since yesterday. As per pt she was last seen normal 7am yesterday. Pt started having Numbness of the Right leg yesterday  which resolved after some time. Today pt woke up around 7 am with dizziness so went back to sleep. Pt then woke up at 10:30 am and noticed Numbness and tingling to the right side of face, arm and leg associated with weakness . Symptoms improve after 1 hr. Pt is having severe one sided headache every day for last 1 month. Pt is non compliant with her BP medication and donot check her BP at home   Pt denies any prior episode of numbness , h/o migraine, blurry vision, urinary or bladder incontinence ,seizures, CP, SOB, cough , Abd pain , nausea, vomiting or any urinary symptoms     ED course:   Vitals : Afebrile . 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   Utox -ve     SH : Denies Smoking, alcohol or drug use (10 Mar 2020 19:19)      Patient is a 49y old  Female who presents with a chief complaint of R sided Numbness and weakness (17 Mar 2020 14:47)      INTERVAL HPI/OVERNIGHT EVENTS:  T(C): 36.8 (03-17-20 @ 11:31), Max: 36.9 (03-16-20 @ 19:45)  HR: 85 (03-17-20 @ 13:00) (62 - 85)  BP: 114/82 (03-17-20 @ 13:00) (114/82 - 143/82)  RR: 17 (03-17-20 @ 11:31) (16 - 17)  SpO2: 98% (03-17-20 @ 11:31) (98% - 100%)  Wt(kg): --  I&O's Summary      REVIEW OF SYSTEMS: denies fever, chills, SOB, palpitations, chest pain, abdominal pain, nausea, vomitting, diarrhea, constipation, dizziness    MEDICATIONS  (STANDING):  aspirin  chewable 81 milliGRAM(s) Oral daily  atorvastatin 80 milliGRAM(s) Oral at bedtime  clopidogrel Tablet 75 milliGRAM(s) Oral daily  enoxaparin Injectable 40 milliGRAM(s) SubCutaneous daily  ergocalciferol 94222 Unit(s) Oral <User Schedule>  influenza   Vaccine 0.5 milliLiter(s) IntraMuscular once  verapamil 80 milliGRAM(s) Oral every 8 hours    MEDICATIONS  (PRN):  acetaminophen   Tablet .. 650 milliGRAM(s) Oral every 6 hours PRN Temp greater or equal to 38C (100.4F), Mild Pain (1 - 3)  melatonin 3 milliGRAM(s) Oral at bedtime PRN Insomnia  traMADol 50 milliGRAM(s) Oral every 8 hours PRN Moderate Pain (4 - 6)      PHYSICAL EXAM:  GENERAL: NAD, well-groomed, well-developed  HEAD:  Atraumatic, Normocephalic  EYES: EOMI, PERRLA, conjunctiva and sclera clear  ENMT: No tonsillar erythema, exudates, or enlargement; Moist mucous membranes, Good dentition, No lesions  NECK: Supple, No JVD, Normal thyroid  NERVOUS SYSTEM:  Alert & Oriented X3, Good concentration; Motor Strength 5/5 B/L upper and lower extremities; DTRs 2+ intact and symmetric  CHEST/LUNG: Clear to percussion bilaterally; No rales, rhonchi, wheezing, or rubs  HEART: Regular rate and rhythm; No murmurs, rubs, or gallops  ABDOMEN: Soft, Nontender, Nondistended; Bowel sounds present  EXTREMITIES:  2+ Peripheral Pulses, No clubbing, cyanosis, or edema  LYMPH: No lymphadenopathy noted  SKIN: No rashes or lesions  LABS:                        11.8   5.63  )-----------( 289      ( 17 Mar 2020 06:49 )             36.2     03-17    137  |  105  |  9   ----------------------------<  84  3.7   |  26  |  0.74    Ca    8.2<L>      17 Mar 2020 06:49  Phos  2.8     03-17  Mg     1.9     03-17    TPro  6.9  /  Alb  3.0<L>  /  TBili  0.3  /  DBili  x   /  AST  44<H>  /  ALT  46  /  AlkPhos  80  03-16        CAPILLARY BLOOD GLUCOSE

## 2020-03-18 VITALS
SYSTOLIC BLOOD PRESSURE: 124 MMHG | DIASTOLIC BLOOD PRESSURE: 89 MMHG | RESPIRATION RATE: 18 BRPM | OXYGEN SATURATION: 100 % | TEMPERATURE: 98 F | HEART RATE: 82 BPM

## 2020-03-18 LAB
ANION GAP SERPL CALC-SCNC: 7 MMOL/L — SIGNIFICANT CHANGE UP (ref 5–17)
BASOPHILS # BLD AUTO: 0.02 K/UL — SIGNIFICANT CHANGE UP (ref 0–0.2)
BASOPHILS NFR BLD AUTO: 0.3 % — SIGNIFICANT CHANGE UP (ref 0–2)
BUN SERPL-MCNC: 11 MG/DL — SIGNIFICANT CHANGE UP (ref 7–18)
CALCIUM SERPL-MCNC: 8.3 MG/DL — LOW (ref 8.4–10.5)
CHLORIDE SERPL-SCNC: 106 MMOL/L — SIGNIFICANT CHANGE UP (ref 96–108)
CO2 SERPL-SCNC: 27 MMOL/L — SIGNIFICANT CHANGE UP (ref 22–31)
CREAT SERPL-MCNC: 0.79 MG/DL — SIGNIFICANT CHANGE UP (ref 0.5–1.3)
EOSINOPHIL # BLD AUTO: 0.06 K/UL — SIGNIFICANT CHANGE UP (ref 0–0.5)
EOSINOPHIL NFR BLD AUTO: 1 % — SIGNIFICANT CHANGE UP (ref 0–6)
GLUCOSE SERPL-MCNC: 83 MG/DL — SIGNIFICANT CHANGE UP (ref 70–99)
HCT VFR BLD CALC: 36.5 % — SIGNIFICANT CHANGE UP (ref 34.5–45)
HGB BLD-MCNC: 11.8 G/DL — SIGNIFICANT CHANGE UP (ref 11.5–15.5)
HGB S BLD QL: POSITIVE
IMM GRANULOCYTES NFR BLD AUTO: 0.2 % — SIGNIFICANT CHANGE UP (ref 0–1.5)
LYMPHOCYTES # BLD AUTO: 2.81 K/UL — SIGNIFICANT CHANGE UP (ref 1–3.3)
LYMPHOCYTES # BLD AUTO: 48.1 % — HIGH (ref 13–44)
MAGNESIUM SERPL-MCNC: 1.9 MG/DL — SIGNIFICANT CHANGE UP (ref 1.6–2.6)
MCHC RBC-ENTMCNC: 21.7 PG — LOW (ref 27–34)
MCHC RBC-ENTMCNC: 32.3 GM/DL — SIGNIFICANT CHANGE UP (ref 32–36)
MCV RBC AUTO: 67.2 FL — LOW (ref 80–100)
MONOCYTES # BLD AUTO: 0.44 K/UL — SIGNIFICANT CHANGE UP (ref 0–0.9)
MONOCYTES NFR BLD AUTO: 7.5 % — SIGNIFICANT CHANGE UP (ref 2–14)
NEUTROPHILS # BLD AUTO: 2.5 K/UL — SIGNIFICANT CHANGE UP (ref 1.8–7.4)
NEUTROPHILS NFR BLD AUTO: 42.9 % — LOW (ref 43–77)
NRBC # BLD: 0 /100 WBCS — SIGNIFICANT CHANGE UP (ref 0–0)
PHOSPHATE SERPL-MCNC: 3.8 MG/DL — SIGNIFICANT CHANGE UP (ref 2.5–4.5)
PLATELET # BLD AUTO: 317 K/UL — SIGNIFICANT CHANGE UP (ref 150–400)
POTASSIUM SERPL-MCNC: 3.7 MMOL/L — SIGNIFICANT CHANGE UP (ref 3.5–5.3)
POTASSIUM SERPL-SCNC: 3.7 MMOL/L — SIGNIFICANT CHANGE UP (ref 3.5–5.3)
RBC # BLD: 5.43 M/UL — HIGH (ref 3.8–5.2)
RBC # FLD: 17.2 % — HIGH (ref 10.3–14.5)
SODIUM SERPL-SCNC: 140 MMOL/L — SIGNIFICANT CHANGE UP (ref 135–145)
SOLUBILITY: POSITIVE
WBC # BLD: 5.84 K/UL — SIGNIFICANT CHANGE UP (ref 3.8–10.5)
WBC # FLD AUTO: 5.84 K/UL — SIGNIFICANT CHANGE UP (ref 3.8–10.5)

## 2020-03-18 PROCEDURE — 83550 IRON BINDING TEST: CPT

## 2020-03-18 PROCEDURE — 82962 GLUCOSE BLOOD TEST: CPT

## 2020-03-18 PROCEDURE — 93306 TTE W/DOPPLER COMPLETE: CPT

## 2020-03-18 PROCEDURE — 85730 THROMBOPLASTIN TIME PARTIAL: CPT

## 2020-03-18 PROCEDURE — 83540 ASSAY OF IRON: CPT

## 2020-03-18 PROCEDURE — 80307 DRUG TEST PRSMV CHEM ANLYZR: CPT

## 2020-03-18 PROCEDURE — 83036 HEMOGLOBIN GLYCOSYLATED A1C: CPT

## 2020-03-18 PROCEDURE — 36415 COLL VENOUS BLD VENIPUNCTURE: CPT

## 2020-03-18 PROCEDURE — 80048 BASIC METABOLIC PNL TOTAL CA: CPT

## 2020-03-18 PROCEDURE — 90686 IIV4 VACC NO PRSV 0.5 ML IM: CPT

## 2020-03-18 PROCEDURE — 84484 ASSAY OF TROPONIN QUANT: CPT

## 2020-03-18 PROCEDURE — 93005 ELECTROCARDIOGRAM TRACING: CPT

## 2020-03-18 PROCEDURE — 96375 TX/PRO/DX INJ NEW DRUG ADDON: CPT

## 2020-03-18 PROCEDURE — 70551 MRI BRAIN STEM W/O DYE: CPT

## 2020-03-18 PROCEDURE — 70496 CT ANGIOGRAPHY HEAD: CPT

## 2020-03-18 PROCEDURE — 70450 CT HEAD/BRAIN W/O DYE: CPT

## 2020-03-18 PROCEDURE — 82607 VITAMIN B-12: CPT

## 2020-03-18 PROCEDURE — 96374 THER/PROPH/DIAG INJ IV PUSH: CPT | Mod: XU

## 2020-03-18 PROCEDURE — 83020 HEMOGLOBIN ELECTROPHORESIS: CPT

## 2020-03-18 PROCEDURE — 80053 COMPREHEN METABOLIC PANEL: CPT

## 2020-03-18 PROCEDURE — 97530 THERAPEUTIC ACTIVITIES: CPT

## 2020-03-18 PROCEDURE — 97110 THERAPEUTIC EXERCISES: CPT

## 2020-03-18 PROCEDURE — 70498 CT ANGIOGRAPHY NECK: CPT

## 2020-03-18 PROCEDURE — 82306 VITAMIN D 25 HYDROXY: CPT

## 2020-03-18 PROCEDURE — 82550 ASSAY OF CK (CPK): CPT

## 2020-03-18 PROCEDURE — 84702 CHORIONIC GONADOTROPIN TEST: CPT

## 2020-03-18 PROCEDURE — 84443 ASSAY THYROID STIM HORMONE: CPT

## 2020-03-18 PROCEDURE — 82728 ASSAY OF FERRITIN: CPT

## 2020-03-18 PROCEDURE — 85027 COMPLETE CBC AUTOMATED: CPT

## 2020-03-18 PROCEDURE — 97163 PT EVAL HIGH COMPLEX 45 MIN: CPT

## 2020-03-18 PROCEDURE — 83735 ASSAY OF MAGNESIUM: CPT

## 2020-03-18 PROCEDURE — 99285 EMERGENCY DEPT VISIT HI MDM: CPT

## 2020-03-18 PROCEDURE — 97116 GAIT TRAINING THERAPY: CPT

## 2020-03-18 PROCEDURE — 84100 ASSAY OF PHOSPHORUS: CPT

## 2020-03-18 PROCEDURE — 80061 LIPID PANEL: CPT

## 2020-03-18 PROCEDURE — 82553 CREATINE MB FRACTION: CPT

## 2020-03-18 PROCEDURE — 85610 PROTHROMBIN TIME: CPT

## 2020-03-18 PROCEDURE — 74230 X-RAY XM SWLNG FUNCJ C+: CPT

## 2020-03-18 RX ADMIN — Medication 80 MILLIGRAM(S): at 06:05

## 2020-03-18 RX ADMIN — ERGOCALCIFEROL 50000 UNIT(S): 1.25 CAPSULE ORAL at 13:16

## 2020-03-18 RX ADMIN — Medication 81 MILLIGRAM(S): at 13:16

## 2020-03-18 RX ADMIN — Medication 80 MILLIGRAM(S): at 13:16

## 2020-03-18 RX ADMIN — CLOPIDOGREL BISULFATE 75 MILLIGRAM(S): 75 TABLET, FILM COATED ORAL at 13:16

## 2020-03-18 RX ADMIN — INFLUENZA VIRUS VACCINE 0.5 MILLILITER(S): 15; 15; 15; 15 SUSPENSION INTRAMUSCULAR at 06:11

## 2020-03-18 RX ADMIN — ENOXAPARIN SODIUM 40 MILLIGRAM(S): 100 INJECTION SUBCUTANEOUS at 13:16

## 2020-03-18 NOTE — PROGRESS NOTE ADULT - ASSESSMENT
_________________________________________________________________________________________  ========>>  M E D I C A L   A T T E N D I N G    F O L L O W  U P  N O T E  <<=========  -----------------------------------------------------------------------------------------------------    - Patient seen and examined by me approximately sixty minutes ago.  (covering today)   - In summary,  GEORGIANA RAMIREZ is a 49y year old woman who originally presented with numbness   - Patient today overall doing ok, comfortable, eating OK. comfrotable but weak, decreased appetite     ==================>> REVIEW OF SYSTEM <<=================    GEN: no fever, no chills, no pain  RESP: no SOB, no cough, no sputum  CVS: no chest pain, no palpitations, no edema  GI: no abdominal pain, no nausea, decreased appetite   : no dysuria  Neuro: no headache, no dizziness, otherwise stable / no new c/o  Derm : no itching, no rash    ==================>> PHYSICAL EXAM <<=================    GEN: A&O X 3 , NAD , comfortable, sleepy and tired appearing   HEENT: NCAT, PERRL, MMM, hearing intact  Neck: supple , no JVD appreciated  CVS: S1S2 , regular , No M/R/G appreciated  PULM: CTA B/L,  no W/R/R appreciated  ABD.: soft. non tender, non distended,  bowel sounds present  Extrem: intact pulses , no edema   PSYCH : normal mood,  not anxious      ==================>> MEDICATIONS <<====================    MEDICATIONS  (STANDING):  aspirin  chewable 81 milliGRAM(s) Oral daily  atorvastatin 80 milliGRAM(s) Oral at bedtime  clopidogrel Tablet 75 milliGRAM(s) Oral daily  enoxaparin Injectable 40 milliGRAM(s) SubCutaneous daily  ergocalciferol 00825 Unit(s) Oral <User Schedule>  influenza   Vaccine 0.5 milliLiter(s) IntraMuscular once  verapamil 80 milliGRAM(s) Oral every 8 hours    MEDICATIONS  (PRN):  acetaminophen   Tablet .. 650 milliGRAM(s) Oral every 6 hours PRN Temp greater or equal to 38C (100.4F), Mild Pain (1 - 3)  ketorolac   Injectable 30 milliGRAM(s) IV Push every 6 hours PRN Severe Pain (7 - 10)  melatonin 3 milliGRAM(s) Oral at bedtime PRN Insomnia  traMADol 50 milliGRAM(s) Oral every 8 hours PRN Moderate Pain (4 - 6)    ==================>> VITAL SIGNS <<==================    T(C): 36.5 (03-15-20 @ 07:55), Max: 36.9 (03-14-20 @ 23:13)  HR: 75 (03-15-20 @ 07:55) (75 - 93)  BP: 116/70 (03-15-20 @ 07:55) (116/70 - 148/90)  RR: 18 (03-15-20 @ 07:55) (18 - 19)  SpO2: 99% (03-15-20 @ 07:55) (97% - 99%)     I&O's Summary    14 Mar 2020 07:01  -  15 Mar 2020 07:00  --------------------------------------------------------  IN: 275 mL / OUT: 0 mL / NET: 275 mL     ==================>> LAB AND IMAGING <<==================                        12.1   4.83  )-----------( 331      ( 15 Mar 2020 07:16 )             38.0        140  |  108  |  9   ----------------------------<  92  3.8   |  24  |  0.78    Ca    8.2<L>      15 Mar 2020 07:16  Phos  2.7     03-15  Mg     2.0     03-15    TPro  7.5  /  Alb  3.2<L>  /  TBili  0.4  /  DBili  x   /  AST  33  /  ALT  38  /  AlkPhos  90  03-15    TSH:      0.56   (03-11-20)           Lipid profile:  (03-11-20)     Total: 140     LDL  : 81     HDL  :50     TG   :46     HgA1C: 5.7  (03-11-20)            ___________________________________________________________________________________  ===============>>  A S S E S S M E N T   A N D   P L A N <<===============  ------------------------------------------------------------------------------------------    · Assessment		  49 y F from home walks independently with Mhx of HTN ( non compliant with meds), Sickle cell trait( never received any transfusion) and PSHx of heart surgery?( for some clot) presented to ED with Numbness, tingling and weakness of the Right side    Problem/Plan - 1:  ·  Problem: CVA       p/w Right sided numbness, tingling and weakness   - CTH : subacute to chronic L PCA infarct , no hemorrhage   -  Transthoracic echo with bubble study= EF>55, Mild mitral regurgitation. Trace aortic regurgitation.  Vit B12 wnl ,   -  PT eval SUGGEST MEI,   -official SnS suggested barium swallow which showed food stcking at c6,c7 level so  Dr Fisher consulted RECOMMEND NO INTERVENTION  - Neuro follow up for further mgmt / recom   pt on asprin, clopidogrel, statin.   PT / OOb as able   aspiration precautions     Problem/Plan - 2:  ·  Problem: Uncontrolled hypertension.  Plan: Pt non compliant with her med  -started on  verapamil 80 tid  -monitor BP  - cardio following     -GI/DVT Prophylaxis.    --------------------------------------------  Case discussed with pt  Education given on findings and plan of care     encouraged increased activity and PO intake as able   ___________________________  H. ORAL Murray.  (covering today)   Pager: [phone]
49 y F from home walks independently with Mhx of HTN ( non compliant with meds), Sickle cell trait( never received any transfusion) and PSHx of heart surgery?( for some clot) presented to ED with Numbness, tingling and weakness of the Right side    Pt will be admitted to tele for CVA
49 y F from home walks independently with PMHX of HTN ( non compliant with meds), Sickle cell trait( never received any transfusion) and PSHx of heart surgery?( for some clot) presented to ED with Numbness, tingling and weakness of the Right side since yesterday.  1.Await MEI.  2.HTN and sinus tach-cont verapamil 80mg tid.  3.Stress test as outpatient 4-6 weeks.  4.CVA-asa,plavix,statin.  5.Neurology f/u.  6.GI and DVT prophylaxis.
49 y F from home walks independently with PMHX of HTN ( non compliant with meds), Sickle cell trait( never received any transfusion) and PSHx of heart surgery?( for some clot) presented to ED with Numbness, tingling and weakness of the Right side since yesterday.  1.Await MEI.  2.HTN and sinus tach-cont verapamil 80mg tid.  3.Stress test as outpatient 4-6 weeks.  4.CVA-asa,plavix,statin.  5.Neurology f/u.  6.GI and DVT prophylaxis.
49 y F from home walks independently with PMHX of HTN ( non compliant with meds), Sickle cell trait( never received any transfusion) and PSHx of heart surgery?( for some clot) presented to ED with Numbness, tingling and weakness of the Right side since yesterday.  1.D/C Tele monitoring.  2.HTN and sinus tach-cont verapamil 80mg tid.  3.Stress test as outpatient 4-6 weeks.  4.CVA-asa,plavix,statin.  5.Neurology f/u.  6.GI and DVT prophylaxis.
49 y F from home walks independently with PMHX of HTN ( non compliant with meds), Sickle cell trait( never received any transfusion) and PSHx of heart surgery?( for some clot) presented to ED with Numbness, tingling and weakness of the Right side since yesterday.  1.Fe def -GI eval noted.  2.HTN and sinus tach-cont verapamil 80mg tid.  3.Stress test as outpatient 4-6 weeks.  4.CVA-asa,plavix,statin.  5.Neurology f/u.  6.GI and DVT prophylaxis.
49 y F from home walks independently with PMHX of HTN ( non compliant with meds), Sickle cell trait( never received any transfusion) and PSHx of heart surgery?( for some clot) presented to ED with Numbness, tingling and weakness of the Right side since yesterday.  1.Tele monitoring.  2.HTN and sinus tach-cont verapamil 80mg tid.  3.Stress test as outpatient 4-6 weeks.  4.CVA-asa,plavix,statin.  5.Neurology f/u.  6.GI and DVT prophylaxis.
49 y F from home walks independently with PMHX of HTN ( non compliant with meds), Sickle cell trait( never received any transfusion) and PSHx of heart surgery?( for some clot) presented to ED with Numbness, tingling and weakness of the Right side since yesterday.  1.Tele monitoring.  2.HTN and sinus tach-cont verapamil 80mg tid.  3.Stress test as outpatient 4-6 weeks.  4.CVA-asa,plavix,statin.  5.Neurology f/u.  6.Replace k+.  7.GI and DVT prophylaxis.
49 y F from home walks independently with PMHX of HTN ( non compliant with meds), Sickle cell trait( never received any transfusion) and PSHx of heart surgery?( for some clot) presented to ED with Numbness, tingling and weakness of the Right side. GI consulted for persistent dysphagia     Plan:  Dysphagia   Likely form the patient's stoke  Unlikely EGD would be informative   Follow s/s reccs   aspiration precautions   Discharge planning   Advanced care planning was discussed with patient and family.  Advanced care planning forms were reviewed and discussed.  Risks, benefits and alternatives of gastroenterologic procedures were discussed in detail and all questions were answered.
49 y F from home walks independently with PMHX of HTN ( non compliant with meds), Sickle cell trait( never received any transfusion) and PSHx of heart surgery?( for some clot) presented to ED with Numbness, tingling and weakness of the Right side. GI consulted for persistent dysphagia     Plan:  Dysphagia   Likely form the patient's stroke proving   I  Unlikely EGD would be informative   Follow s/s reccs   aspiration precautions   Discharge planning   Advanced care planning was discussed with patient and family.  Advanced care planning forms were reviewed and discussed.  Risks, benefits and alternatives of gastroenterologic procedures were discussed in detail and all questions were answered.
on chair comfortable  not in any distress.  no palp, no cp.  still very weak on rt side  3/5  hr regular   bp is controlled   ct angio showed lacunar infarct  but vessels are ok  neuro to f/u again   echo pending  strict diet  exrcise
seen and examiend vsstable afebrile physical uncahged  no cp or sob or palp.  bm ok  still feeling weak on rt   bp is controlled  as per cardio can be d/cd to rehab   neuro to f/u  k 3.3  supplement k  as per my communication with neuro and card  both D/c pt  to rehab  out pt f/u with pcp, neuro, card
seen and examined  comfortable on bed  comfortable, but cant rt uex up  also weak on rt lower extre.  denies palp, cp or sob  vsstable  bp is well controlled  above VS is error.  physical rt sided weakness  klabs noted    going for MRI today
seen and examined  on chair comfortable  not in any distress    vs stable afebrile  physical done  has rt u ext 3-4 /5 power  alsmost same a s yesterday   labs noted  echo done  acceptable no fo  a/p acute cva  possible sec to hypertension  neuro and card on board    tsh nml  abnormal barium swallow  NPO  GI
seen and examined  vsstable afebrile physical uncahged  no cp or sob or palp  bm ok.  on bed awake has now facial droop  rt sides 3/5 weakness   alert awake     acute cva possible sec to Uncontrolled htn vs other  awaiting for CT angio, echo  d/w neurologist about change in ms .  htn  [id number]  oob in chair
seen and examined comfortable with mild headache  no nausea or vomiting   sbp is 180  .    neurologically alert awake x 3  no nystagmus  no cerebeller dysf  power reflex nmlwill   give amlodipine keep systolic bp 160-170  neuro, echo, carotid  low salt diet
seen and examined vs stable afebrile  no complaints  no headach.  no abd pain   vision fair   bp is controlled  power unchanged  d/c to rehab  out pt neuro to f/u
seen and examined vsstabel afebrile physical unchanged  not in any distress no cp or sob or palp.  comfortable on bed  weak on rt side  3-4/5 power  no other complaints   labs noted  mri  lt sided small infarcts   a/p acute Cva sec to uncontrolled htn with non compliance   now bp is well controlle  pulse is ok  on plavix and asa  statin  pt is going for rehab  d/w pt for  strict diet    compliant to medication f/u pcp and neurology

## 2020-03-18 NOTE — PROGRESS NOTE ADULT - REASON FOR ADMISSION
R sided Numbness and weakness

## 2020-03-18 NOTE — DISCHARGE NOTE NURSING/CASE MANAGEMENT/SOCIAL WORK - PATIENT PORTAL LINK FT
You can access the FollowMyHealth Patient Portal offered by [facility] by registering at the following website: [email or web address]. By joining [facility]’s FollowMyHealth portal, you will also be able to view your health information using other applications (apps) compatible with our system.

## 2020-03-18 NOTE — PROGRESS NOTE ADULT - SUBJECTIVE AND OBJECTIVE BOX
HPI:  49 y F from home walks independently with Mhx of HTN ( non compliant with meds), Sickle cell trait( never received any transfusion) and PSHx of heart surgery?( for some clot) presented to ED with Numbness, tingling and weakness of the Right side since yesterday. As per pt she was last seen normal 7am yesterday. Pt started having Numbness of the Right leg yesterday  which resolved after some time. Today pt woke up around 7 am with dizziness so went back to sleep. Pt then woke up at 10:30 am and noticed Numbness and tingling to the right side of face, arm and leg associated with weakness . Symptoms improve after 1 hr. Pt is having severe one sided headache every day for last 1 month. Pt is non compliant with her BP medication and donot check her BP at home   Pt denies any prior episode of numbness , h/o migraine, blurry vision, urinary or bladder incontinence ,seizures, CP, SOB, cough , Abd pain , nausea, vomiting or any urinary symptoms     ED course:   Vitals : Afebrile . 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   Utox -ve     SH : Denies Smoking, alcohol or drug use (10 Mar 2020 19:19)      Patient is a 49y old  Female who presents with a chief complaint of R sided Numbness and weakness (18 Mar 2020 07:58)      INTERVAL HPI/OVERNIGHT EVENTS:  T(C): 36.4 (03-18-20 @ 13:12), Max: 36.9 (03-17-20 @ 23:25)  HR: 82 (03-18-20 @ 13:12) (55 - 92)  BP: 124/89 (03-18-20 @ 13:12) (106/75 - 130/86)  RR: 18 (03-18-20 @ 13:12) (18 - 19)  SpO2: 100% (03-18-20 @ 13:12) (98% - 100%)  Wt(kg): --  I&O's Summary    17 Mar 2020 07:01  -  18 Mar 2020 07:00  --------------------------------------------------------  IN: 225 mL / OUT: 0 mL / NET: 225 mL        REVIEW OF SYSTEMS: denies fever, chills, SOB, palpitations, chest pain, abdominal pain, nausea, vomitting, diarrhea, constipation, dizziness    MEDICATIONS  (STANDING):  aspirin  chewable 81 milliGRAM(s) Oral daily  atorvastatin 80 milliGRAM(s) Oral at bedtime  clopidogrel Tablet 75 milliGRAM(s) Oral daily  enoxaparin Injectable 40 milliGRAM(s) SubCutaneous daily  ergocalciferol 99378 Unit(s) Oral <User Schedule>  verapamil 80 milliGRAM(s) Oral every 8 hours    MEDICATIONS  (PRN):  acetaminophen   Tablet .. 650 milliGRAM(s) Oral every 6 hours PRN Temp greater or equal to 38C (100.4F), Mild Pain (1 - 3)  melatonin 3 milliGRAM(s) Oral at bedtime PRN Insomnia  traMADol 50 milliGRAM(s) Oral every 8 hours PRN Moderate Pain (4 - 6)      PHYSICAL EXAM:  GENERAL: NAD, well-groomed, well-developed  HEAD:  Atraumatic, Normocephalic  EYES: EOMI, PERRLA, conjunctiva and sclera clear  ENMT: No tonsillar erythema, exudates, or enlargement; Moist mucous membranes, Good dentition, No lesions  NECK: Supple, No JVD, Normal thyroid  NERVOUS SYSTEM:  Alert & Oriented X3, Good concentration; Motor Strength 5/5 B/L upper and lower extremities; DTRs 2+ intact and symmetric  CHEST/LUNG: Clear to percussion bilaterally; No rales, rhonchi, wheezing, or rubs  HEART: Regular rate and rhythm; No murmurs, rubs, or gallops  ABDOMEN: Soft, Nontender, Nondistended; Bowel sounds present  EXTREMITIES:  2+ Peripheral Pulses, No clubbing, cyanosis, or edema  LYMPH: No lymphadenopathy noted  SKIN: No rashes or lesions  LABS:                        11.8   5.84  )-----------( 317      ( 18 Mar 2020 07:01 )             36.5     03-18    140  |  106  |  11  ----------------------------<  83  3.7   |  27  |  0.79    Ca    8.3<L>      18 Mar 2020 07:01  Phos  3.8     03-18  Mg     1.9     03-18          CAPILLARY BLOOD GLUCOSE

## 2020-03-18 NOTE — PROGRESS NOTE ADULT - SUBJECTIVE AND OBJECTIVE BOX
CHIEF COMPLAINT:Patient is a 49y old  Female who presents with a chief complaint of R sided Numbness and weakness.Pt appears comfortable.    	  REVIEW OF SYSTEMS:  CONSTITUTIONAL: No fever, weight loss, or fatigue  EYES: No eye pain, visual disturbances, or discharge  ENT:  No difficulty hearing, tinnitus, vertigo; No sinus or throat pain  NECK: No pain or stiffness  RESPIRATORY: No cough, wheezing, chills or hemoptysis; No Shortness of Breath  CARDIOVASCULAR: No chest pain, palpitations, passing out, dizziness, or leg swelling  GASTROINTESTINAL: No abdominal or epigastric pain. No nausea, vomiting, or hematemesis; No diarrhea or constipation. No melena or hematochezia.  GENITOURINARY: No dysuria, frequency, hematuria, or incontinence  NEUROLOGICAL: No headaches, memory loss, loss of strength, numbness, or tremors  SKIN: No itching, burning, rashes, or lesions   LYMPH Nodes: No enlarged glands  ENDOCRINE: No heat or cold intolerance; No hair loss  MUSCULOSKELETAL: No joint pain or swelling; No muscle, back, or extremity pain  PSYCHIATRIC: No depression, anxiety, mood swings, or difficulty sleeping  HEME/LYMPH: No easy bruising, or bleeding gums  ALLERGY AND IMMUNOLOGIC: No hives or eczema	    PHYSICAL EXAM:  T(C): 36.8 (03-18-20 @ 05:45), Max: 36.9 (03-17-20 @ 23:25)  HR: 61 (03-18-20 @ 05:45) (60 - 92)  BP: 130/86 (03-18-20 @ 05:45) (106/75 - 130/86)  RR: 18 (03-18-20 @ 05:45) (17 - 19)  SpO2: 98% (03-18-20 @ 05:45) (98% - 100%)  Wt(kg): --  I&O's Summary    17 Mar 2020 07:01  -  18 Mar 2020 07:00  --------------------------------------------------------  IN: 225 mL / OUT: 0 mL / NET: 225 mL        Appearance: Normal	  HEENT:   Normal oral mucosa, PERRL, EOMI	  Lymphatic: No lymphadenopathy  Cardiovascular: Normal S1 S2, No JVD, No murmurs, No edema  Respiratory: Lungs clear to auscultation	  Psychiatry: A & O x 3, Mood & affect appropriate  Gastrointestinal:  Soft, Non-tender, + BS	  Skin: No rashes, No ecchymoses, No cyanosis	  Neurologic: Non-focal  Extremities: Normal range of motion, No clubbing, cyanosis or edema  Vascular: Peripheral pulses palpable 2+ bilaterally    MEDICATIONS  (STANDING):  aspirin  chewable 81 milliGRAM(s) Oral daily  atorvastatin 80 milliGRAM(s) Oral at bedtime  clopidogrel Tablet 75 milliGRAM(s) Oral daily  enoxaparin Injectable 40 milliGRAM(s) SubCutaneous daily  ergocalciferol 67239 Unit(s) Oral <User Schedule>  verapamil 80 milliGRAM(s) Oral every 8 hours      	  	  LABS:	 	                         11.8   5.63  )-----------( 289      ( 17 Mar 2020 06:49 )             36.2     03-18    140  |  106  |  11  ----------------------------<  83  3.7   |  27  |  0.79    Ca    8.3<L>      18 Mar 2020 07:01  Phos  3.8     03-18  Mg     1.9     03-18        Lipid Profile: Cholesterol 140  LDL 81  HDL 50  TG 46    HgA1c: Hemoglobin A1C, Whole Blood: 5.7 % (03-11 @ 09:40)    TSH: Thyroid Stimulating Hormone, Serum: 0.56 uU/mL (03-11 @ 08:20)      	  Iron with Total Binding Capacity (03.17.20 @ 12:23)    Iron - Total Binding Capacity.: 394 ug/dL    % Saturation, Iron: 7 %    Iron Total, Serum: 26 ug/dL    Unsaturated Iron Binding Capacity: 368 ug/dL

## 2020-03-19 LAB
HEMOGLOBIN INTERPRETATION: SIGNIFICANT CHANGE UP
HGB A MFR BLD: 64.8 % — LOW (ref 95.8–98)
HGB A2 MFR BLD: 3.2 % — SIGNIFICANT CHANGE UP (ref 2–3.2)
HGB F MFR BLD: 0.8 % — SIGNIFICANT CHANGE UP (ref 0–1)
HGB S MFR BLD: 31.2 % — HIGH

## 2024-06-19 NOTE — H&P ADULT - PROBLEM/PLAN-4
Detail Level: Generalized Detail Level: Simple Detail Level: Detailed Skin Checks Recommendations: Full body skin check annually Sunscreen Recommendations: applied daily to exposed areas DISPLAY PLAN FREE TEXT